# Patient Record
Sex: MALE | Race: WHITE | Employment: FULL TIME
[De-identification: names, ages, dates, MRNs, and addresses within clinical notes are randomized per-mention and may not be internally consistent; named-entity substitution may affect disease eponyms.]

---

## 2017-01-02 DIAGNOSIS — I10 ESSENTIAL HYPERTENSION: Chronic | ICD-10-CM

## 2017-02-14 ENCOUNTER — OFFICE VISIT (OUTPATIENT)
Dept: INTERNAL MEDICINE | Facility: CLINIC | Age: 57
End: 2017-02-14

## 2017-02-14 VITALS
BODY MASS INDEX: 30.49 KG/M2 | SYSTOLIC BLOOD PRESSURE: 128 MMHG | RESPIRATION RATE: 14 BRPM | HEIGHT: 65 IN | WEIGHT: 183 LBS | DIASTOLIC BLOOD PRESSURE: 82 MMHG

## 2017-02-14 DIAGNOSIS — J20.1 ACUTE BRONCHITIS DUE TO HAEMOPHILUS INFLUENZAE: ICD-10-CM

## 2017-02-14 DIAGNOSIS — R73.03 PREDIABETES: ICD-10-CM

## 2017-02-14 DIAGNOSIS — K21.9 GASTROESOPHAGEAL REFLUX DISEASE WITHOUT ESOPHAGITIS: ICD-10-CM

## 2017-02-14 DIAGNOSIS — I10 ESSENTIAL HYPERTENSION: Primary | Chronic | ICD-10-CM

## 2017-02-14 PROCEDURE — G8484 FLU IMMUNIZE NO ADMIN: HCPCS | Performed by: INTERNAL MEDICINE

## 2017-02-14 PROCEDURE — 99213 OFFICE O/P EST LOW 20 MIN: CPT | Performed by: INTERNAL MEDICINE

## 2017-02-14 PROCEDURE — G8417 CALC BMI ABV UP PARAM F/U: HCPCS | Performed by: INTERNAL MEDICINE

## 2017-02-14 PROCEDURE — 1036F TOBACCO NON-USER: CPT | Performed by: INTERNAL MEDICINE

## 2017-02-14 PROCEDURE — G8599 NO ASA/ANTIPLAT THER USE RNG: HCPCS | Performed by: INTERNAL MEDICINE

## 2017-02-14 PROCEDURE — G8427 DOCREV CUR MEDS BY ELIG CLIN: HCPCS | Performed by: INTERNAL MEDICINE

## 2017-02-14 PROCEDURE — 3017F COLORECTAL CA SCREEN DOC REV: CPT | Performed by: INTERNAL MEDICINE

## 2017-02-14 RX ORDER — AMOXICILLIN AND CLAVULANATE POTASSIUM 875; 125 MG/1; MG/1
1 TABLET, FILM COATED ORAL 2 TIMES DAILY
Qty: 20 TABLET | Refills: 0 | Status: SHIPPED | OUTPATIENT
Start: 2017-02-14 | End: 2017-02-14 | Stop reason: SDUPTHER

## 2017-02-14 RX ORDER — AMOXICILLIN AND CLAVULANATE POTASSIUM 875; 125 MG/1; MG/1
1 TABLET, FILM COATED ORAL 2 TIMES DAILY
Qty: 20 TABLET | Refills: 0 | Status: SHIPPED | OUTPATIENT
Start: 2017-02-14 | End: 2017-02-24

## 2017-02-14 ASSESSMENT — ENCOUNTER SYMPTOMS
SORE THROAT: 1
NAUSEA: 0
EYE PAIN: 0
DIARRHEA: 0
COUGH: 1
SHORTNESS OF BREATH: 0
ABDOMINAL PAIN: 0
TROUBLE SWALLOWING: 0
VOMITING: 0
BLOOD IN STOOL: 0
WHEEZING: 0
CHEST TIGHTNESS: 0
SINUS PAIN: 1
COLOR CHANGE: 0
SINUS PRESSURE: 1
ANAL BLEEDING: 0
BACK PAIN: 0
CONSTIPATION: 0
RHINORRHEA: 1
EYE DISCHARGE: 0

## 2017-02-21 ENCOUNTER — OFFICE VISIT (OUTPATIENT)
Dept: INTERNAL MEDICINE | Facility: CLINIC | Age: 57
End: 2017-02-21

## 2017-02-21 VITALS
SYSTOLIC BLOOD PRESSURE: 114 MMHG | HEART RATE: 74 BPM | OXYGEN SATURATION: 95 % | BODY MASS INDEX: 30.49 KG/M2 | HEIGHT: 65 IN | WEIGHT: 183 LBS | DIASTOLIC BLOOD PRESSURE: 78 MMHG | TEMPERATURE: 98.3 F

## 2017-02-21 DIAGNOSIS — J20.9 ACUTE BRONCHITIS, UNSPECIFIED ORGANISM: Primary | ICD-10-CM

## 2017-02-21 PROCEDURE — 1036F TOBACCO NON-USER: CPT | Performed by: NURSE PRACTITIONER

## 2017-02-21 PROCEDURE — G8427 DOCREV CUR MEDS BY ELIG CLIN: HCPCS | Performed by: NURSE PRACTITIONER

## 2017-02-21 PROCEDURE — 99213 OFFICE O/P EST LOW 20 MIN: CPT | Performed by: NURSE PRACTITIONER

## 2017-02-21 PROCEDURE — 3017F COLORECTAL CA SCREEN DOC REV: CPT | Performed by: NURSE PRACTITIONER

## 2017-02-21 PROCEDURE — G8417 CALC BMI ABV UP PARAM F/U: HCPCS | Performed by: NURSE PRACTITIONER

## 2017-02-21 PROCEDURE — G8599 NO ASA/ANTIPLAT THER USE RNG: HCPCS | Performed by: NURSE PRACTITIONER

## 2017-02-21 PROCEDURE — G8484 FLU IMMUNIZE NO ADMIN: HCPCS | Performed by: NURSE PRACTITIONER

## 2017-02-21 RX ORDER — CEFUROXIME AXETIL 500 MG/1
500 TABLET ORAL 2 TIMES DAILY
Qty: 20 TABLET | Refills: 0 | Status: SHIPPED | OUTPATIENT
Start: 2017-02-21 | End: 2017-03-03

## 2017-02-21 RX ORDER — BENZONATATE 100 MG/1
100 CAPSULE ORAL 3 TIMES DAILY PRN
Qty: 30 CAPSULE | Refills: 0 | Status: SHIPPED | OUTPATIENT
Start: 2017-02-21 | End: 2017-03-03

## 2017-02-21 ASSESSMENT — ENCOUNTER SYMPTOMS
SORE THROAT: 1
COUGH: 1
RHINORRHEA: 1
SINUS PRESSURE: 1

## 2017-04-11 RX ORDER — DOXYCYCLINE HYCLATE 100 MG
100 TABLET ORAL 2 TIMES DAILY
Qty: 20 TABLET | Refills: 0 | Status: SHIPPED | OUTPATIENT
Start: 2017-04-11 | End: 2017-04-21

## 2017-05-08 ENCOUNTER — TELEPHONE (OUTPATIENT)
Dept: INTERNAL MEDICINE CLINIC | Age: 57
End: 2017-05-08

## 2017-08-01 ENCOUNTER — TELEPHONE (OUTPATIENT)
Dept: INTERNAL MEDICINE CLINIC | Age: 57
End: 2017-08-01

## 2017-12-30 DIAGNOSIS — I10 ESSENTIAL HYPERTENSION: Chronic | ICD-10-CM

## 2018-02-16 ENCOUNTER — OFFICE VISIT (OUTPATIENT)
Dept: INTERNAL MEDICINE CLINIC | Age: 58
End: 2018-02-16
Payer: COMMERCIAL

## 2018-02-16 VITALS
BODY MASS INDEX: 31.65 KG/M2 | DIASTOLIC BLOOD PRESSURE: 68 MMHG | HEIGHT: 65 IN | WEIGHT: 190 LBS | SYSTOLIC BLOOD PRESSURE: 110 MMHG

## 2018-02-16 DIAGNOSIS — I10 ESSENTIAL HYPERTENSION: Chronic | ICD-10-CM

## 2018-02-16 DIAGNOSIS — J40 TRACHEOBRONCHITIS: Primary | ICD-10-CM

## 2018-02-16 DIAGNOSIS — E78.2 MIXED HYPERLIPIDEMIA: ICD-10-CM

## 2018-02-16 PROCEDURE — G8417 CALC BMI ABV UP PARAM F/U: HCPCS | Performed by: INTERNAL MEDICINE

## 2018-02-16 PROCEDURE — G8427 DOCREV CUR MEDS BY ELIG CLIN: HCPCS | Performed by: INTERNAL MEDICINE

## 2018-02-16 PROCEDURE — 1036F TOBACCO NON-USER: CPT | Performed by: INTERNAL MEDICINE

## 2018-02-16 PROCEDURE — G8484 FLU IMMUNIZE NO ADMIN: HCPCS | Performed by: INTERNAL MEDICINE

## 2018-02-16 PROCEDURE — 3017F COLORECTAL CA SCREEN DOC REV: CPT | Performed by: INTERNAL MEDICINE

## 2018-02-16 PROCEDURE — 99213 OFFICE O/P EST LOW 20 MIN: CPT | Performed by: INTERNAL MEDICINE

## 2018-02-16 PROCEDURE — G8599 NO ASA/ANTIPLAT THER USE RNG: HCPCS | Performed by: INTERNAL MEDICINE

## 2018-02-16 RX ORDER — CEPHALEXIN 250 MG/1
250 CAPSULE ORAL 3 TIMES DAILY
Qty: 21 CAPSULE | Refills: 0 | Status: SHIPPED | OUTPATIENT
Start: 2018-02-16 | End: 2018-02-23

## 2018-02-16 RX ORDER — METHYLPREDNISOLONE 4 MG/1
TABLET ORAL
Qty: 1 KIT | Refills: 0 | Status: SHIPPED | OUTPATIENT
Start: 2018-02-16 | End: 2018-02-22

## 2018-02-16 ASSESSMENT — PATIENT HEALTH QUESTIONNAIRE - PHQ9
SUM OF ALL RESPONSES TO PHQ9 QUESTIONS 1 & 2: 2
SUM OF ALL RESPONSES TO PHQ QUESTIONS 1-9: 2
1. LITTLE INTEREST OR PLEASURE IN DOING THINGS: 1
2. FEELING DOWN, DEPRESSED OR HOPELESS: 1

## 2018-02-16 ASSESSMENT — ENCOUNTER SYMPTOMS
SHORTNESS OF BREATH: 0
COUGH: 1
EYE PAIN: 0
DIARRHEA: 0
TROUBLE SWALLOWING: 0
COLOR CHANGE: 0
ABDOMINAL DISTENTION: 0
SORE THROAT: 1
EYE DISCHARGE: 0
WHEEZING: 0
BLOOD IN STOOL: 0

## 2018-02-16 NOTE — PROGRESS NOTES
and palpitations. Gastrointestinal: Negative for abdominal distention, blood in stool and diarrhea. Endocrine: Negative for polydipsia and polyphagia. Genitourinary: Negative for difficulty urinating and frequency. Musculoskeletal: Negative for gait problem, myalgias and neck pain. Skin: Negative for color change and rash. Allergic/Immunologic: Negative for environmental allergies and food allergies. Neurological: Negative for dizziness and headaches. Hematological: Negative for adenopathy. Does not bruise/bleed easily. Psychiatric/Behavioral: Negative for behavioral problems and sleep disturbance. Objective:   Physical Exam   Constitutional: He is oriented to person, place, and time. He appears well-developed and well-nourished. obese   HENT:   Head: Normocephalic and atraumatic. Eyes: Conjunctivae and EOM are normal. Right eye exhibits no discharge. Left eye exhibits no discharge. Right conjunctiva is not injected. Left conjunctiva is not injected. Right eye exhibits normal extraocular motion. Left eye exhibits normal extraocular motion. Neck: Normal range of motion. Neck supple. No JVD present. No edema and no erythema present. No thyroid mass and no thyromegaly present. Cardiovascular: Normal rate and regular rhythm. Exam reveals no friction rub. No murmur heard. Pulmonary/Chest: Effort normal and breath sounds normal. No accessory muscle usage. No tachypnea and no bradypnea. No respiratory distress. He has no wheezes. He has no rales. Abdominal: Soft. Bowel sounds are normal. He exhibits no distension. There is no tenderness. There is no rebound. Musculoskeletal: Normal range of motion. He exhibits no edema or tenderness. Lymphadenopathy:        Head (right side): No submental and no submandibular adenopathy present. Head (left side): No submental and no submandibular adenopathy present. He has no cervical adenopathy.    Neurological: He is alert and oriented

## 2018-02-21 ENCOUNTER — TELEPHONE (OUTPATIENT)
Dept: INTERNAL MEDICINE CLINIC | Age: 58
End: 2018-02-21

## 2018-03-08 ENCOUNTER — HOSPITAL ENCOUNTER (OUTPATIENT)
Age: 58
Setting detail: SPECIMEN
Discharge: HOME | End: 2018-03-08
Payer: COMMERCIAL

## 2018-03-08 DIAGNOSIS — J40 TRACHEOBRONCHITIS: ICD-10-CM

## 2018-03-08 LAB
ABSOLUTE EOS #: 0.14 K/UL (ref 0–0.44)
ABSOLUTE IMMATURE GRANULOCYTE: <0.03 K/UL (ref 0–0.3)
ABSOLUTE LYMPH #: 1.82 K/UL (ref 1.1–3.7)
ABSOLUTE MONO #: 0.47 K/UL (ref 0.1–1.2)
ANION GAP SERPL CALCULATED.3IONS-SCNC: 15 MMOL/L (ref 9–17)
BASOPHILS # BLD: 1 % (ref 0–2)
BASOPHILS ABSOLUTE: 0.04 K/UL (ref 0–0.2)
BUN BLDV-MCNC: 19 MG/DL (ref 6–20)
BUN/CREAT BLD: ABNORMAL (ref 9–20)
CALCIUM SERPL-MCNC: 8.8 MG/DL (ref 8.6–10.4)
CHLORIDE BLD-SCNC: 109 MMOL/L (ref 98–107)
CHOLESTEROL/HDL RATIO: 4.5
CHOLESTEROL: 177 MG/DL
CO2: 20 MMOL/L (ref 20–31)
CREAT SERPL-MCNC: 0.8 MG/DL (ref 0.7–1.2)
DIFFERENTIAL TYPE: NORMAL
EOSINOPHILS RELATIVE PERCENT: 3 % (ref 1–4)
GFR AFRICAN AMERICAN: >60 ML/MIN
GFR NON-AFRICAN AMERICAN: >60 ML/MIN
GFR SERPL CREATININE-BSD FRML MDRD: ABNORMAL ML/MIN/{1.73_M2}
GFR SERPL CREATININE-BSD FRML MDRD: ABNORMAL ML/MIN/{1.73_M2}
GLUCOSE BLD-MCNC: 103 MG/DL (ref 70–99)
HCT VFR BLD CALC: 44.3 % (ref 40.7–50.3)
HDLC SERPL-MCNC: 39 MG/DL
HEMOGLOBIN: 14.6 G/DL (ref 13–17)
IMMATURE GRANULOCYTES: 0 %
LDL CHOLESTEROL: 101 MG/DL (ref 0–130)
LYMPHOCYTES # BLD: 33 % (ref 24–43)
MCH RBC QN AUTO: 30.7 PG (ref 25.2–33.5)
MCHC RBC AUTO-ENTMCNC: 33 G/DL (ref 28.4–34.8)
MCV RBC AUTO: 93.1 FL (ref 82.6–102.9)
MONOCYTES # BLD: 9 % (ref 3–12)
NRBC AUTOMATED: 0 PER 100 WBC
PDW BLD-RTO: 13.2 % (ref 11.8–14.4)
PLATELET # BLD: 252 K/UL (ref 138–453)
PLATELET ESTIMATE: NORMAL
PMV BLD AUTO: 9.9 FL (ref 8.1–13.5)
POTASSIUM SERPL-SCNC: 4.9 MMOL/L (ref 3.7–5.3)
RBC # BLD: 4.76 M/UL (ref 4.21–5.77)
RBC # BLD: NORMAL 10*6/UL
SEG NEUTROPHILS: 54 % (ref 36–65)
SEGMENTED NEUTROPHILS ABSOLUTE COUNT: 3.07 K/UL (ref 1.5–8.1)
SODIUM BLD-SCNC: 144 MMOL/L (ref 135–144)
TRIGL SERPL-MCNC: 184 MG/DL
VLDLC SERPL CALC-MCNC: ABNORMAL MG/DL (ref 1–30)
WBC # BLD: 5.6 K/UL (ref 3.5–11.3)
WBC # BLD: NORMAL 10*3/UL

## 2018-03-15 ENCOUNTER — OFFICE VISIT (OUTPATIENT)
Dept: INTERNAL MEDICINE CLINIC | Age: 58
End: 2018-03-15
Payer: COMMERCIAL

## 2018-03-15 VITALS
WEIGHT: 188 LBS | BODY MASS INDEX: 31.32 KG/M2 | SYSTOLIC BLOOD PRESSURE: 110 MMHG | HEIGHT: 65 IN | DIASTOLIC BLOOD PRESSURE: 74 MMHG

## 2018-03-15 DIAGNOSIS — I10 ESSENTIAL HYPERTENSION: Primary | Chronic | ICD-10-CM

## 2018-03-15 DIAGNOSIS — R73.9 HYPERGLYCEMIA: ICD-10-CM

## 2018-03-15 DIAGNOSIS — K59.1 DIARRHEA, FUNCTIONAL: ICD-10-CM

## 2018-03-15 PROCEDURE — G8417 CALC BMI ABV UP PARAM F/U: HCPCS | Performed by: INTERNAL MEDICINE

## 2018-03-15 PROCEDURE — 1036F TOBACCO NON-USER: CPT | Performed by: INTERNAL MEDICINE

## 2018-03-15 PROCEDURE — G8599 NO ASA/ANTIPLAT THER USE RNG: HCPCS | Performed by: INTERNAL MEDICINE

## 2018-03-15 PROCEDURE — G8427 DOCREV CUR MEDS BY ELIG CLIN: HCPCS | Performed by: INTERNAL MEDICINE

## 2018-03-15 PROCEDURE — 3017F COLORECTAL CA SCREEN DOC REV: CPT | Performed by: INTERNAL MEDICINE

## 2018-03-15 PROCEDURE — 99213 OFFICE O/P EST LOW 20 MIN: CPT | Performed by: INTERNAL MEDICINE

## 2018-03-15 PROCEDURE — G8484 FLU IMMUNIZE NO ADMIN: HCPCS | Performed by: INTERNAL MEDICINE

## 2018-03-15 RX ORDER — LOPERAMIDE HYDROCHLORIDE 2 MG/1
2 CAPSULE ORAL 4 TIMES DAILY PRN
Qty: 90 CAPSULE | Refills: 1 | Status: SHIPPED | OUTPATIENT
Start: 2018-03-15 | End: 2018-04-14

## 2018-03-15 ASSESSMENT — ENCOUNTER SYMPTOMS
DIARRHEA: 1
COUGH: 0
WHEEZING: 0
ABDOMINAL DISTENTION: 0
SHORTNESS OF BREATH: 0
COLOR CHANGE: 0
BLOOD IN STOOL: 0
EYE PAIN: 0
EYE DISCHARGE: 0
TROUBLE SWALLOWING: 0

## 2018-03-15 NOTE — PROGRESS NOTES
normal muscle tone. Coordination normal.   Skin: Skin is warm. No bruising, no ecchymosis and no rash noted. He is not diaphoretic. No pallor. Psychiatric: He has a normal mood and affect. His behavior is normal. His mood appears not anxious. His affect is not angry. His speech is not slurred. He is not aggressive. Cognition and memory are not impaired. He expresses no homicidal ideation. I have personally reviewed and agree with the patient Paolo NAVARRO Shayla is a 56 y.o. male who presents today for follow up on his chronic medical conditions as noted below. Bonnie Vergara is c/o of   Chief Complaint   Patient presents with    Results     labs    Diarrhea     pt states since gallbladder removal in oct of 2016, pt states anything he eats goes right through him.         Patient Active Problem List:     Other and unspecified angina pectoris     Anxiety state     Essential hypertension     Sleep apnea     Other and unspecified hyperlipidemia     Esophageal reflux     Rectal bleeding     Diarrhea, functional     Calculus of gallbladder     Calculus of gallbladder without cholecystitis without obstruction     Acute bronchitis due to Haemophilus influenzae     Past Medical History:   Diagnosis Date    Anxiety state, unspecified     Cholelithiasis     Hypoglycemia     Other and unspecified angina pectoris     2009-stress related    Other and unspecified hyperlipidemia     hx of-no meds    Unspecified essential hypertension     Unspecified sleep apnea     Vision abnormalities       Past Surgical History:   Procedure Laterality Date    COLONOSCOPY  12/4/14    NORMAL-RANDOM BIOPSY     Family History   Problem Relation Age of Onset    Heart Failure Father     Cancer Mother      outside the colon, and breast    Diabetes Sister     Cancer Maternal Grandmother      kidney    Cancer Maternal Grandfather      bladder     Current Outpatient Prescriptions   Medication Sig Dispense Refill   

## 2018-05-01 ENCOUNTER — OFFICE VISIT (OUTPATIENT)
Dept: INTERNAL MEDICINE CLINIC | Age: 58
End: 2018-05-01
Payer: COMMERCIAL

## 2018-05-01 ENCOUNTER — TELEPHONE (OUTPATIENT)
Dept: INTERNAL MEDICINE CLINIC | Age: 58
End: 2018-05-01

## 2018-05-01 VITALS
WEIGHT: 186 LBS | HEIGHT: 65 IN | SYSTOLIC BLOOD PRESSURE: 118 MMHG | DIASTOLIC BLOOD PRESSURE: 74 MMHG | BODY MASS INDEX: 30.99 KG/M2

## 2018-05-01 DIAGNOSIS — K59.1 DIARRHEA, FUNCTIONAL: ICD-10-CM

## 2018-05-01 PROCEDURE — 3017F COLORECTAL CA SCREEN DOC REV: CPT | Performed by: INTERNAL MEDICINE

## 2018-05-01 PROCEDURE — 1036F TOBACCO NON-USER: CPT | Performed by: INTERNAL MEDICINE

## 2018-05-01 PROCEDURE — 99214 OFFICE O/P EST MOD 30 MIN: CPT | Performed by: INTERNAL MEDICINE

## 2018-05-01 PROCEDURE — G8427 DOCREV CUR MEDS BY ELIG CLIN: HCPCS | Performed by: INTERNAL MEDICINE

## 2018-05-01 PROCEDURE — G8599 NO ASA/ANTIPLAT THER USE RNG: HCPCS | Performed by: INTERNAL MEDICINE

## 2018-05-01 PROCEDURE — G8417 CALC BMI ABV UP PARAM F/U: HCPCS | Performed by: INTERNAL MEDICINE

## 2018-05-01 RX ORDER — CIPROFLOXACIN 500 MG/1
500 TABLET, FILM COATED ORAL 2 TIMES DAILY
Qty: 14 TABLET | Refills: 0 | Status: SHIPPED | OUTPATIENT
Start: 2018-05-01 | End: 2018-05-08

## 2018-05-11 ENCOUNTER — OFFICE VISIT (OUTPATIENT)
Dept: INTERNAL MEDICINE CLINIC | Age: 58
End: 2018-05-11
Payer: COMMERCIAL

## 2018-05-11 VITALS
HEIGHT: 65 IN | WEIGHT: 184 LBS | SYSTOLIC BLOOD PRESSURE: 112 MMHG | DIASTOLIC BLOOD PRESSURE: 70 MMHG | BODY MASS INDEX: 30.66 KG/M2

## 2018-05-11 DIAGNOSIS — I10 ESSENTIAL HYPERTENSION: Primary | Chronic | ICD-10-CM

## 2018-05-11 DIAGNOSIS — J30.2 SEASONAL ALLERGIC RHINITIS, UNSPECIFIED CHRONICITY, UNSPECIFIED TRIGGER: ICD-10-CM

## 2018-05-11 DIAGNOSIS — R10.13 EPIGASTRIC PAIN: ICD-10-CM

## 2018-05-11 PROCEDURE — G8599 NO ASA/ANTIPLAT THER USE RNG: HCPCS | Performed by: INTERNAL MEDICINE

## 2018-05-11 PROCEDURE — G8427 DOCREV CUR MEDS BY ELIG CLIN: HCPCS | Performed by: INTERNAL MEDICINE

## 2018-05-11 PROCEDURE — 1036F TOBACCO NON-USER: CPT | Performed by: INTERNAL MEDICINE

## 2018-05-11 PROCEDURE — G8417 CALC BMI ABV UP PARAM F/U: HCPCS | Performed by: INTERNAL MEDICINE

## 2018-05-11 PROCEDURE — 99214 OFFICE O/P EST MOD 30 MIN: CPT | Performed by: INTERNAL MEDICINE

## 2018-05-11 PROCEDURE — 3017F COLORECTAL CA SCREEN DOC REV: CPT | Performed by: INTERNAL MEDICINE

## 2018-05-11 RX ORDER — METHYLPREDNISOLONE 4 MG/1
TABLET ORAL
Qty: 1 KIT | Refills: 0 | Status: SHIPPED | OUTPATIENT
Start: 2018-05-11 | End: 2018-05-17

## 2018-05-11 ASSESSMENT — ENCOUNTER SYMPTOMS
BLOOD IN STOOL: 0
BACK PAIN: 1
ABDOMINAL DISTENTION: 0
SHORTNESS OF BREATH: 0
COUGH: 0
ABDOMINAL PAIN: 1
WHEEZING: 0
TROUBLE SWALLOWING: 0
COLOR CHANGE: 0
EYE DISCHARGE: 0
DIARRHEA: 0
EYE PAIN: 0

## 2018-05-11 ASSESSMENT — PATIENT HEALTH QUESTIONNAIRE - PHQ9
1. LITTLE INTEREST OR PLEASURE IN DOING THINGS: 1
2. FEELING DOWN, DEPRESSED OR HOPELESS: 1
SUM OF ALL RESPONSES TO PHQ9 QUESTIONS 1 & 2: 2
SUM OF ALL RESPONSES TO PHQ QUESTIONS 1-9: 2

## 2018-05-22 ENCOUNTER — HOSPITAL ENCOUNTER (OUTPATIENT)
Dept: CT IMAGING | Age: 58
Discharge: HOME | End: 2018-05-24
Payer: COMMERCIAL

## 2018-05-22 DIAGNOSIS — R10.13 EPIGASTRIC PAIN: ICD-10-CM

## 2018-05-22 LAB
CREAT SERPL-MCNC: 0.8 MG/DL (ref 0.7–1.2)
GFR AFRICAN AMERICAN: >60 ML/MIN
GFR NON-AFRICAN AMERICAN: >60 ML/MIN
GFR SERPL CREATININE-BSD FRML MDRD: NORMAL ML/MIN/{1.73_M2}
GFR SERPL CREATININE-BSD FRML MDRD: NORMAL ML/MIN/{1.73_M2}

## 2018-05-22 PROCEDURE — 2580000003 HC RX 258: Performed by: INTERNAL MEDICINE

## 2018-05-22 PROCEDURE — 82565 ASSAY OF CREATININE: CPT

## 2018-05-22 PROCEDURE — 74177 CT ABD & PELVIS W/CONTRAST: CPT

## 2018-05-22 PROCEDURE — 36415 COLL VENOUS BLD VENIPUNCTURE: CPT

## 2018-05-22 PROCEDURE — 6360000004 HC RX CONTRAST MEDICATION: Performed by: INTERNAL MEDICINE

## 2018-05-22 RX ORDER — 0.9 % SODIUM CHLORIDE 0.9 %
80 INTRAVENOUS SOLUTION INTRAVENOUS ONCE
Status: COMPLETED | OUTPATIENT
Start: 2018-05-22 | End: 2018-05-22

## 2018-05-22 RX ORDER — SODIUM CHLORIDE 0.9 % (FLUSH) 0.9 %
10 SYRINGE (ML) INJECTION PRN
Status: DISCONTINUED | OUTPATIENT
Start: 2018-05-22 | End: 2018-05-25 | Stop reason: HOSPADM

## 2018-05-22 RX ADMIN — IOHEXOL 50 ML: 240 INJECTION, SOLUTION INTRATHECAL; INTRAVASCULAR; INTRAVENOUS; ORAL at 15:13

## 2018-05-22 RX ADMIN — SODIUM CHLORIDE 80 ML: 9 INJECTION, SOLUTION INTRAVENOUS at 15:12

## 2018-05-22 RX ADMIN — IOPAMIDOL 75 ML: 755 INJECTION, SOLUTION INTRAVENOUS at 15:13

## 2018-05-22 RX ADMIN — Medication 10 ML: at 15:13

## 2018-05-25 ENCOUNTER — HOSPITAL ENCOUNTER (OUTPATIENT)
Age: 58
Setting detail: SPECIMEN
Discharge: HOME | End: 2018-05-25
Payer: COMMERCIAL

## 2018-05-25 ENCOUNTER — OFFICE VISIT (OUTPATIENT)
Dept: INTERNAL MEDICINE CLINIC | Age: 58
End: 2018-05-25
Payer: COMMERCIAL

## 2018-05-25 VITALS
HEART RATE: 72 BPM | HEIGHT: 65 IN | BODY MASS INDEX: 30.66 KG/M2 | SYSTOLIC BLOOD PRESSURE: 122 MMHG | DIASTOLIC BLOOD PRESSURE: 80 MMHG | WEIGHT: 184 LBS

## 2018-05-25 DIAGNOSIS — K58.0 IRRITABLE BOWEL SYNDROME WITH DIARRHEA: ICD-10-CM

## 2018-05-25 DIAGNOSIS — K58.0 IRRITABLE BOWEL SYNDROME WITH DIARRHEA: Primary | ICD-10-CM

## 2018-05-25 PROCEDURE — 3017F COLORECTAL CA SCREEN DOC REV: CPT | Performed by: INTERNAL MEDICINE

## 2018-05-25 PROCEDURE — G8599 NO ASA/ANTIPLAT THER USE RNG: HCPCS | Performed by: INTERNAL MEDICINE

## 2018-05-25 PROCEDURE — G8417 CALC BMI ABV UP PARAM F/U: HCPCS | Performed by: INTERNAL MEDICINE

## 2018-05-25 PROCEDURE — 99213 OFFICE O/P EST LOW 20 MIN: CPT | Performed by: INTERNAL MEDICINE

## 2018-05-25 PROCEDURE — G8427 DOCREV CUR MEDS BY ELIG CLIN: HCPCS | Performed by: INTERNAL MEDICINE

## 2018-05-25 PROCEDURE — 1036F TOBACCO NON-USER: CPT | Performed by: INTERNAL MEDICINE

## 2018-05-29 LAB — TISSUE TRANSGLUTAMINASE IGA: 0.5 U/ML

## 2018-06-04 ASSESSMENT — ENCOUNTER SYMPTOMS
WHEEZING: 0
FACIAL SWELLING: 0
CHEST TIGHTNESS: 0
CONSTIPATION: 0
COUGH: 0
ABDOMINAL PAIN: 1
APNEA: 0
BACK PAIN: 0
DIARRHEA: 0
COLOR CHANGE: 0
NAUSEA: 1
SHORTNESS OF BREATH: 0
ABDOMINAL DISTENTION: 0

## 2018-06-29 ENCOUNTER — HOSPITAL ENCOUNTER (OUTPATIENT)
Age: 58
Setting detail: SPECIMEN
Discharge: HOME | End: 2018-06-29
Payer: COMMERCIAL

## 2018-06-29 DIAGNOSIS — R73.9 HYPERGLYCEMIA: ICD-10-CM

## 2018-06-29 LAB
ANION GAP SERPL CALCULATED.3IONS-SCNC: 12 MMOL/L (ref 9–17)
BUN BLDV-MCNC: 18 MG/DL (ref 6–20)
BUN/CREAT BLD: NORMAL (ref 9–20)
CALCIUM SERPL-MCNC: 8.7 MG/DL (ref 8.6–10.4)
CHLORIDE BLD-SCNC: 107 MMOL/L (ref 98–107)
CO2: 21 MMOL/L (ref 20–31)
CREAT SERPL-MCNC: 0.81 MG/DL (ref 0.7–1.2)
ESTIMATED AVERAGE GLUCOSE: 108 MG/DL
GFR AFRICAN AMERICAN: >60 ML/MIN
GFR NON-AFRICAN AMERICAN: >60 ML/MIN
GFR SERPL CREATININE-BSD FRML MDRD: NORMAL ML/MIN/{1.73_M2}
GFR SERPL CREATININE-BSD FRML MDRD: NORMAL ML/MIN/{1.73_M2}
GLUCOSE BLD-MCNC: 87 MG/DL (ref 70–99)
HBA1C MFR BLD: 5.4 % (ref 4–6)
POTASSIUM SERPL-SCNC: 4.3 MMOL/L (ref 3.7–5.3)
SODIUM BLD-SCNC: 140 MMOL/L (ref 135–144)

## 2018-07-03 ENCOUNTER — OFFICE VISIT (OUTPATIENT)
Dept: INTERNAL MEDICINE CLINIC | Age: 58
End: 2018-07-03
Payer: COMMERCIAL

## 2018-07-03 VITALS
HEIGHT: 65 IN | BODY MASS INDEX: 30.16 KG/M2 | DIASTOLIC BLOOD PRESSURE: 70 MMHG | SYSTOLIC BLOOD PRESSURE: 112 MMHG | WEIGHT: 181 LBS

## 2018-07-03 DIAGNOSIS — K21.9 GASTROESOPHAGEAL REFLUX DISEASE WITHOUT ESOPHAGITIS: ICD-10-CM

## 2018-07-03 DIAGNOSIS — K58.2 IRRITABLE BOWEL SYNDROME WITH BOTH CONSTIPATION AND DIARRHEA: ICD-10-CM

## 2018-07-03 DIAGNOSIS — I10 ESSENTIAL HYPERTENSION: Primary | Chronic | ICD-10-CM

## 2018-07-03 PROCEDURE — G8417 CALC BMI ABV UP PARAM F/U: HCPCS | Performed by: INTERNAL MEDICINE

## 2018-07-03 PROCEDURE — 99213 OFFICE O/P EST LOW 20 MIN: CPT | Performed by: INTERNAL MEDICINE

## 2018-07-03 PROCEDURE — 1036F TOBACCO NON-USER: CPT | Performed by: INTERNAL MEDICINE

## 2018-07-03 PROCEDURE — G8599 NO ASA/ANTIPLAT THER USE RNG: HCPCS | Performed by: INTERNAL MEDICINE

## 2018-07-03 PROCEDURE — 3017F COLORECTAL CA SCREEN DOC REV: CPT | Performed by: INTERNAL MEDICINE

## 2018-07-03 PROCEDURE — G8427 DOCREV CUR MEDS BY ELIG CLIN: HCPCS | Performed by: INTERNAL MEDICINE

## 2018-07-03 ASSESSMENT — ENCOUNTER SYMPTOMS
COUGH: 0
ABDOMINAL DISTENTION: 0
EYE PAIN: 0
WHEEZING: 0
COLOR CHANGE: 0
TROUBLE SWALLOWING: 0
DIARRHEA: 0
SHORTNESS OF BREATH: 0
BLOOD IN STOOL: 0
EYE DISCHARGE: 0

## 2018-11-02 ENCOUNTER — OFFICE VISIT (OUTPATIENT)
Dept: INTERNAL MEDICINE CLINIC | Age: 58
End: 2018-11-02
Payer: COMMERCIAL

## 2018-11-02 VITALS
OXYGEN SATURATION: 97 % | HEIGHT: 65 IN | DIASTOLIC BLOOD PRESSURE: 74 MMHG | BODY MASS INDEX: 31.16 KG/M2 | HEART RATE: 64 BPM | SYSTOLIC BLOOD PRESSURE: 110 MMHG | TEMPERATURE: 98.2 F | WEIGHT: 187 LBS

## 2018-11-02 DIAGNOSIS — J20.9 ACUTE BRONCHITIS, UNSPECIFIED ORGANISM: Primary | ICD-10-CM

## 2018-11-02 PROCEDURE — G8417 CALC BMI ABV UP PARAM F/U: HCPCS | Performed by: PHYSICIAN ASSISTANT

## 2018-11-02 PROCEDURE — 99213 OFFICE O/P EST LOW 20 MIN: CPT | Performed by: PHYSICIAN ASSISTANT

## 2018-11-02 PROCEDURE — G8427 DOCREV CUR MEDS BY ELIG CLIN: HCPCS | Performed by: PHYSICIAN ASSISTANT

## 2018-11-02 PROCEDURE — 3017F COLORECTAL CA SCREEN DOC REV: CPT | Performed by: PHYSICIAN ASSISTANT

## 2018-11-02 PROCEDURE — 1036F TOBACCO NON-USER: CPT | Performed by: PHYSICIAN ASSISTANT

## 2018-11-02 PROCEDURE — G8599 NO ASA/ANTIPLAT THER USE RNG: HCPCS | Performed by: PHYSICIAN ASSISTANT

## 2018-11-02 PROCEDURE — G8484 FLU IMMUNIZE NO ADMIN: HCPCS | Performed by: PHYSICIAN ASSISTANT

## 2018-11-02 RX ORDER — BENZONATATE 100 MG/1
100 CAPSULE ORAL 3 TIMES DAILY PRN
Qty: 21 CAPSULE | Refills: 0 | Status: SHIPPED | OUTPATIENT
Start: 2018-11-02 | End: 2018-11-09

## 2018-11-02 RX ORDER — CEFUROXIME AXETIL 500 MG/1
500 TABLET ORAL 2 TIMES DAILY
Qty: 14 TABLET | Refills: 0 | Status: SHIPPED | OUTPATIENT
Start: 2018-11-02 | End: 2018-11-09

## 2018-11-02 ASSESSMENT — ENCOUNTER SYMPTOMS
VOMITING: 0
COUGH: 1
COLOR CHANGE: 0
SINUS PRESSURE: 0
NAUSEA: 0
EYE PAIN: 0
CHEST TIGHTNESS: 0
SHORTNESS OF BREATH: 0
ABDOMINAL PAIN: 0
SORE THROAT: 1
RHINORRHEA: 0
EYE REDNESS: 0

## 2018-11-05 ENCOUNTER — TELEPHONE (OUTPATIENT)
Dept: INTERNAL MEDICINE CLINIC | Age: 58
End: 2018-11-05

## 2018-11-05 DIAGNOSIS — R05.9 COUGH: Primary | ICD-10-CM

## 2018-12-25 DIAGNOSIS — I10 ESSENTIAL HYPERTENSION: Chronic | ICD-10-CM

## 2019-02-13 ENCOUNTER — OFFICE VISIT (OUTPATIENT)
Dept: INTERNAL MEDICINE CLINIC | Age: 59
End: 2019-02-13
Payer: COMMERCIAL

## 2019-02-13 VITALS
HEIGHT: 65 IN | WEIGHT: 187 LBS | SYSTOLIC BLOOD PRESSURE: 118 MMHG | DIASTOLIC BLOOD PRESSURE: 80 MMHG | BODY MASS INDEX: 31.16 KG/M2

## 2019-02-13 DIAGNOSIS — J40 TRACHEOBRONCHITIS: ICD-10-CM

## 2019-02-13 DIAGNOSIS — I10 ESSENTIAL HYPERTENSION: Primary | Chronic | ICD-10-CM

## 2019-02-13 PROCEDURE — G8417 CALC BMI ABV UP PARAM F/U: HCPCS | Performed by: INTERNAL MEDICINE

## 2019-02-13 PROCEDURE — G8427 DOCREV CUR MEDS BY ELIG CLIN: HCPCS | Performed by: INTERNAL MEDICINE

## 2019-02-13 PROCEDURE — G8599 NO ASA/ANTIPLAT THER USE RNG: HCPCS | Performed by: INTERNAL MEDICINE

## 2019-02-13 PROCEDURE — 99213 OFFICE O/P EST LOW 20 MIN: CPT | Performed by: INTERNAL MEDICINE

## 2019-02-13 PROCEDURE — G8484 FLU IMMUNIZE NO ADMIN: HCPCS | Performed by: INTERNAL MEDICINE

## 2019-02-13 PROCEDURE — 1036F TOBACCO NON-USER: CPT | Performed by: INTERNAL MEDICINE

## 2019-02-13 PROCEDURE — 3017F COLORECTAL CA SCREEN DOC REV: CPT | Performed by: INTERNAL MEDICINE

## 2019-02-13 RX ORDER — METHYLPREDNISOLONE 4 MG/1
TABLET ORAL
Qty: 1 KIT | Refills: 0 | Status: SHIPPED | OUTPATIENT
Start: 2019-02-13 | End: 2019-02-19

## 2019-02-13 RX ORDER — CEPHALEXIN 500 MG/1
500 CAPSULE ORAL 3 TIMES DAILY
Qty: 21 CAPSULE | Refills: 0 | Status: SHIPPED | OUTPATIENT
Start: 2019-02-13 | End: 2019-02-20

## 2019-02-13 ASSESSMENT — ENCOUNTER SYMPTOMS
DIARRHEA: 0
TROUBLE SWALLOWING: 0
COLOR CHANGE: 0
ABDOMINAL DISTENTION: 0
SORE THROAT: 1
EYE DISCHARGE: 0
EYE PAIN: 0
BLOOD IN STOOL: 0
SHORTNESS OF BREATH: 0
COUGH: 1
WHEEZING: 0

## 2019-02-13 ASSESSMENT — PATIENT HEALTH QUESTIONNAIRE - PHQ9
2. FEELING DOWN, DEPRESSED OR HOPELESS: 0
SUM OF ALL RESPONSES TO PHQ QUESTIONS 1-9: 0
SUM OF ALL RESPONSES TO PHQ9 QUESTIONS 1 & 2: 0
SUM OF ALL RESPONSES TO PHQ QUESTIONS 1-9: 0
1. LITTLE INTEREST OR PLEASURE IN DOING THINGS: 0

## 2019-02-22 ENCOUNTER — OFFICE VISIT (OUTPATIENT)
Dept: INTERNAL MEDICINE CLINIC | Age: 59
End: 2019-02-22
Payer: COMMERCIAL

## 2019-02-22 ENCOUNTER — TELEPHONE (OUTPATIENT)
Dept: INTERNAL MEDICINE CLINIC | Age: 59
End: 2019-02-22

## 2019-02-22 VITALS
DIASTOLIC BLOOD PRESSURE: 64 MMHG | SYSTOLIC BLOOD PRESSURE: 110 MMHG | HEIGHT: 65 IN | WEIGHT: 189 LBS | OXYGEN SATURATION: 95 % | HEART RATE: 89 BPM | BODY MASS INDEX: 31.49 KG/M2

## 2019-02-22 DIAGNOSIS — J40 BRONCHITIS: Primary | ICD-10-CM

## 2019-02-22 DIAGNOSIS — R05.9 COUGH: ICD-10-CM

## 2019-02-22 PROCEDURE — G8427 DOCREV CUR MEDS BY ELIG CLIN: HCPCS | Performed by: PHYSICIAN ASSISTANT

## 2019-02-22 PROCEDURE — G8599 NO ASA/ANTIPLAT THER USE RNG: HCPCS | Performed by: PHYSICIAN ASSISTANT

## 2019-02-22 PROCEDURE — 99213 OFFICE O/P EST LOW 20 MIN: CPT | Performed by: PHYSICIAN ASSISTANT

## 2019-02-22 PROCEDURE — G8417 CALC BMI ABV UP PARAM F/U: HCPCS | Performed by: PHYSICIAN ASSISTANT

## 2019-02-22 PROCEDURE — 3017F COLORECTAL CA SCREEN DOC REV: CPT | Performed by: PHYSICIAN ASSISTANT

## 2019-02-22 PROCEDURE — 1036F TOBACCO NON-USER: CPT | Performed by: PHYSICIAN ASSISTANT

## 2019-02-22 PROCEDURE — G8484 FLU IMMUNIZE NO ADMIN: HCPCS | Performed by: PHYSICIAN ASSISTANT

## 2019-02-22 RX ORDER — DOXYCYCLINE HYCLATE 100 MG/1
100 CAPSULE ORAL 2 TIMES DAILY
Qty: 20 CAPSULE | Refills: 0 | Status: SHIPPED | OUTPATIENT
Start: 2019-02-22 | End: 2019-03-04

## 2019-02-22 RX ORDER — BENZONATATE 100 MG/1
100 CAPSULE ORAL 3 TIMES DAILY PRN
Qty: 30 CAPSULE | Refills: 0 | Status: SHIPPED | OUTPATIENT
Start: 2019-02-22 | End: 2019-03-01

## 2019-02-22 ASSESSMENT — ENCOUNTER SYMPTOMS
COUGH: 1
BACK PAIN: 1
EYE REDNESS: 0
SORE THROAT: 0
CHEST TIGHTNESS: 0
NAUSEA: 0
VOMITING: 0
EYE PAIN: 0
ABDOMINAL PAIN: 0
SINUS PRESSURE: 1
SHORTNESS OF BREATH: 0
COLOR CHANGE: 0
RHINORRHEA: 0
WHEEZING: 0

## 2019-11-12 ENCOUNTER — OFFICE VISIT (OUTPATIENT)
Dept: INTERNAL MEDICINE CLINIC | Age: 59
End: 2019-11-12
Payer: COMMERCIAL

## 2019-11-12 VITALS
DIASTOLIC BLOOD PRESSURE: 74 MMHG | BODY MASS INDEX: 31.51 KG/M2 | HEIGHT: 65 IN | SYSTOLIC BLOOD PRESSURE: 118 MMHG | WEIGHT: 189.11 LBS | TEMPERATURE: 97.6 F

## 2019-11-12 DIAGNOSIS — G47.33 OBSTRUCTIVE SLEEP APNEA SYNDROME: Chronic | ICD-10-CM

## 2019-11-12 DIAGNOSIS — I10 ESSENTIAL HYPERTENSION: Primary | Chronic | ICD-10-CM

## 2019-11-12 DIAGNOSIS — J20.1 ACUTE BRONCHITIS DUE TO HAEMOPHILUS INFLUENZAE: ICD-10-CM

## 2019-11-12 PROCEDURE — 1036F TOBACCO NON-USER: CPT | Performed by: INTERNAL MEDICINE

## 2019-11-12 PROCEDURE — G8599 NO ASA/ANTIPLAT THER USE RNG: HCPCS | Performed by: INTERNAL MEDICINE

## 2019-11-12 PROCEDURE — 99213 OFFICE O/P EST LOW 20 MIN: CPT | Performed by: INTERNAL MEDICINE

## 2019-11-12 PROCEDURE — G8427 DOCREV CUR MEDS BY ELIG CLIN: HCPCS | Performed by: INTERNAL MEDICINE

## 2019-11-12 PROCEDURE — G8417 CALC BMI ABV UP PARAM F/U: HCPCS | Performed by: INTERNAL MEDICINE

## 2019-11-12 PROCEDURE — G8484 FLU IMMUNIZE NO ADMIN: HCPCS | Performed by: INTERNAL MEDICINE

## 2019-11-12 PROCEDURE — 3017F COLORECTAL CA SCREEN DOC REV: CPT | Performed by: INTERNAL MEDICINE

## 2019-11-12 RX ORDER — CLONAZEPAM 1 MG/1
1 TABLET ORAL NIGHTLY
COMMUNITY
Start: 2019-10-29

## 2019-11-12 RX ORDER — DOXYCYCLINE HYCLATE 100 MG
100 TABLET ORAL 2 TIMES DAILY
Qty: 14 TABLET | Refills: 0 | Status: SHIPPED | OUTPATIENT
Start: 2019-11-12 | End: 2019-11-19

## 2019-11-12 ASSESSMENT — ENCOUNTER SYMPTOMS
SINUS PAIN: 1
GASTROINTESTINAL NEGATIVE: 1
ALLERGIC/IMMUNOLOGIC NEGATIVE: 1
EYES NEGATIVE: 1
COUGH: 1
SINUS PRESSURE: 1

## 2019-12-22 DIAGNOSIS — I10 ESSENTIAL HYPERTENSION: Chronic | ICD-10-CM

## 2019-12-30 DIAGNOSIS — I10 ESSENTIAL HYPERTENSION: Chronic | ICD-10-CM

## 2020-02-12 ENCOUNTER — OFFICE VISIT (OUTPATIENT)
Dept: INTERNAL MEDICINE CLINIC | Age: 60
End: 2020-02-12
Payer: COMMERCIAL

## 2020-02-12 ENCOUNTER — HOSPITAL ENCOUNTER (OUTPATIENT)
Age: 60
Setting detail: SPECIMEN
Discharge: HOME | End: 2020-02-12
Payer: COMMERCIAL

## 2020-02-12 VITALS
BODY MASS INDEX: 31.65 KG/M2 | HEIGHT: 65 IN | RESPIRATION RATE: 18 BRPM | DIASTOLIC BLOOD PRESSURE: 74 MMHG | TEMPERATURE: 99.6 F | WEIGHT: 190 LBS | SYSTOLIC BLOOD PRESSURE: 114 MMHG | HEART RATE: 76 BPM

## 2020-02-12 PROBLEM — N40.0 BENIGN PROSTATIC HYPERPLASIA WITHOUT LOWER URINARY TRACT SYMPTOMS: Status: ACTIVE | Noted: 2020-02-12

## 2020-02-12 LAB
-: NORMAL
ABSOLUTE EOS #: 0.17 K/UL (ref 0–0.44)
ABSOLUTE IMMATURE GRANULOCYTE: 0.03 K/UL (ref 0–0.3)
ABSOLUTE LYMPH #: 2.5 K/UL (ref 1.1–3.7)
ABSOLUTE MONO #: 0.37 K/UL (ref 0.1–1.2)
ALBUMIN SERPL-MCNC: 4.2 G/DL (ref 3.5–5.2)
ALBUMIN/GLOBULIN RATIO: 1.4 (ref 1–2.5)
ALP BLD-CCNC: 72 U/L (ref 40–129)
ALT SERPL-CCNC: 69 U/L (ref 5–41)
AMORPHOUS: NORMAL
ANION GAP SERPL CALCULATED.3IONS-SCNC: 19 MMOL/L (ref 9–17)
AST SERPL-CCNC: 43 U/L
BACTERIA: NORMAL
BASOPHILS # BLD: 1 % (ref 0–2)
BASOPHILS ABSOLUTE: 0.06 K/UL (ref 0–0.2)
BILIRUB SERPL-MCNC: 0.31 MG/DL (ref 0.3–1.2)
BILIRUBIN URINE: NEGATIVE
BUN BLDV-MCNC: 16 MG/DL (ref 6–20)
BUN/CREAT BLD: ABNORMAL (ref 9–20)
CALCIUM SERPL-MCNC: 9 MG/DL (ref 8.6–10.4)
CASTS UA: NORMAL /LPF (ref 0–8)
CHLORIDE BLD-SCNC: 103 MMOL/L (ref 98–107)
CO2: 22 MMOL/L (ref 20–31)
COLOR: YELLOW
CREAT SERPL-MCNC: 0.78 MG/DL (ref 0.7–1.2)
CRYSTALS, UA: NORMAL /HPF
DIFFERENTIAL TYPE: ABNORMAL
EOSINOPHILS RELATIVE PERCENT: 3 % (ref 1–4)
EPITHELIAL CELLS UA: NORMAL /HPF (ref 0–5)
GFR AFRICAN AMERICAN: >60 ML/MIN
GFR NON-AFRICAN AMERICAN: >60 ML/MIN
GFR SERPL CREATININE-BSD FRML MDRD: ABNORMAL ML/MIN/{1.73_M2}
GFR SERPL CREATININE-BSD FRML MDRD: ABNORMAL ML/MIN/{1.73_M2}
GLUCOSE BLD-MCNC: 117 MG/DL (ref 70–99)
GLUCOSE URINE: NEGATIVE
HCT VFR BLD CALC: 48.1 % (ref 40.7–50.3)
HEMOGLOBIN: 15.7 G/DL (ref 13–17)
IMMATURE GRANULOCYTES: 1 %
KETONES, URINE: NEGATIVE
LEUKOCYTE ESTERASE, URINE: NEGATIVE
LYMPHOCYTES # BLD: 40 % (ref 24–43)
MCH RBC QN AUTO: 30.4 PG (ref 25.2–33.5)
MCHC RBC AUTO-ENTMCNC: 32.6 G/DL (ref 28.4–34.8)
MCV RBC AUTO: 93.2 FL (ref 82.6–102.9)
MONOCYTES # BLD: 6 % (ref 3–12)
MUCUS: NORMAL
NITRITE, URINE: NEGATIVE
NRBC AUTOMATED: 0 PER 100 WBC
OTHER OBSERVATIONS UA: NORMAL
PDW BLD-RTO: 13.2 % (ref 11.8–14.4)
PH UA: 5 (ref 5–8)
PLATELET # BLD: 277 K/UL (ref 138–453)
PLATELET ESTIMATE: ABNORMAL
PMV BLD AUTO: 9.8 FL (ref 8.1–13.5)
POTASSIUM SERPL-SCNC: 4.1 MMOL/L (ref 3.7–5.3)
PROSTATE SPECIFIC ANTIGEN: 11.85 UG/L
PROTEIN UA: NEGATIVE
RBC # BLD: 5.16 M/UL (ref 4.21–5.77)
RBC # BLD: ABNORMAL 10*6/UL
RBC UA: NORMAL /HPF (ref 0–4)
RENAL EPITHELIAL, UA: NORMAL /HPF
SEDIMENTATION RATE, ERYTHROCYTE: 2 MM (ref 0–10)
SEG NEUTROPHILS: 49 % (ref 36–65)
SEGMENTED NEUTROPHILS ABSOLUTE COUNT: 3.08 K/UL (ref 1.5–8.1)
SODIUM BLD-SCNC: 144 MMOL/L (ref 135–144)
SPECIFIC GRAVITY UA: 1.02 (ref 1–1.03)
TOTAL PROTEIN: 7.1 G/DL (ref 6.4–8.3)
TRICHOMONAS: NORMAL
TSH SERPL DL<=0.05 MIU/L-ACNC: 0.31 MIU/L (ref 0.3–5)
TURBIDITY: CLEAR
URINE HGB: NEGATIVE
UROBILINOGEN, URINE: NORMAL
WBC # BLD: 6.2 K/UL (ref 3.5–11.3)
WBC # BLD: ABNORMAL 10*3/UL
WBC UA: NORMAL /HPF (ref 0–5)
YEAST: NORMAL

## 2020-02-12 PROCEDURE — G8417 CALC BMI ABV UP PARAM F/U: HCPCS | Performed by: INTERNAL MEDICINE

## 2020-02-12 PROCEDURE — G8427 DOCREV CUR MEDS BY ELIG CLIN: HCPCS | Performed by: INTERNAL MEDICINE

## 2020-02-12 PROCEDURE — 99214 OFFICE O/P EST MOD 30 MIN: CPT | Performed by: INTERNAL MEDICINE

## 2020-02-12 PROCEDURE — G8484 FLU IMMUNIZE NO ADMIN: HCPCS | Performed by: INTERNAL MEDICINE

## 2020-02-12 PROCEDURE — 3017F COLORECTAL CA SCREEN DOC REV: CPT | Performed by: INTERNAL MEDICINE

## 2020-02-12 PROCEDURE — 1036F TOBACCO NON-USER: CPT | Performed by: INTERNAL MEDICINE

## 2020-02-12 ASSESSMENT — ENCOUNTER SYMPTOMS
EYES NEGATIVE: 1
COUGH: 1
SINUS PAIN: 0
ALLERGIC/IMMUNOLOGIC NEGATIVE: 1
GASTROINTESTINAL NEGATIVE: 1
SINUS PRESSURE: 0

## 2020-02-12 ASSESSMENT — PATIENT HEALTH QUESTIONNAIRE - PHQ9
2. FEELING DOWN, DEPRESSED OR HOPELESS: 0
SUM OF ALL RESPONSES TO PHQ QUESTIONS 1-9: 0
SUM OF ALL RESPONSES TO PHQ QUESTIONS 1-9: 0
SUM OF ALL RESPONSES TO PHQ9 QUESTIONS 1 & 2: 0
1. LITTLE INTEREST OR PLEASURE IN DOING THINGS: 0

## 2020-02-12 NOTE — PROGRESS NOTES
Subjective:      Patient ID: Piyush Stearns is a 64 y.o. male. He has no symptoms but found to have high PSA      Review of Systems   Constitutional: Negative. HENT: Negative. Negative for congestion, sinus pressure and sinus pain. Eyes: Negative. Respiratory: Positive for cough. Cardiovascular: Negative. Gastrointestinal: Negative. Endocrine: Negative. Genitourinary: Negative. Musculoskeletal: Negative. Allergic/Immunologic: Negative. Neurological: Negative. Hematological: Negative. Psychiatric/Behavioral: Negative. Objective:   Physical Exam  Constitutional:       Appearance: Normal appearance. HENT:      Head: Normocephalic and atraumatic. Right Ear: Tympanic membrane, ear canal and external ear normal.      Left Ear: Tympanic membrane, ear canal and external ear normal. There is impacted cerumen. Nose: Congestion present. Mouth/Throat:      Mouth: Mucous membranes are dry. Eyes:      Extraocular Movements: Extraocular movements intact. Pupils: Pupils are equal, round, and reactive to light. Neck:      Musculoskeletal: Normal range of motion. Cardiovascular:      Rate and Rhythm: Normal rate and regular rhythm. Heart sounds: No murmur. Pulmonary:      Breath sounds: No wheezing or rales. Abdominal:      General: Abdomen is flat. Palpations: Abdomen is soft. Tenderness: There is no abdominal tenderness. Genitourinary:     Penis: Normal.       Scrotum/Testes: Normal.      Prostate: Normal.      Rectum: Normal.      Comments: BPH 2 + with normal consistency  Musculoskeletal: Normal range of motion. Skin:     General: Skin is warm and dry. Neurological:      General: No focal deficit present. Mental Status: He is alert and oriented to person, place, and time. Psychiatric:         Mood and Affect: Mood normal.         Behavior: Behavior normal.         Thought Content:  Thought content normal.         Judgment: Judgment normal.         Assessment / Plan:   Eveline Cueto was seen today for results, health maintenance and other. Diagnoses and all orders for this visit:    Essential hypertension= well controlled     Obstructive sleep apnea syndrome-on SV    Benign prostatic hyperplasia without lower urinary tract symptoms=PSA and lab work       Return in about 6 months (around 8/12/2020) for Follow Up. No orders of the defined types were placed in this encounter. No orders of the defined types were placed in this encounter. Visit Information    Have you changed or started any medications since your last visit including any over-the-counter medicines, vitamins, or herbal medicines? no   Are you having any side effects from any of your medications? -  no  Have you stopped taking any of your medications? Is so, why? -  no    Have you seen any other physician or provider since your last visit? No  Have you had any other diagnostic tests since your last visit? No  Have you been seen in the emergency room and/or had an admission to a hospital since we last saw you? No  Have you had your routine dental cleaning in the past 6 months? yes     Have you activated your [email or web address] account? If not, what are your barriers?  Yes     Patient Care Team:  Maurice Camargo MD as PCP - Alfredo Ramirez MD as PCP - [facility] Empaneled Provider  Emerald Bryan MD as Consulting Physician (Gastroenterology)    Medical History Review  Past Medical, Family, and Social History reviewed and does contribute to the patient presenting condition    Health Maintenance   Topic Date Due    Hepatitis C screen  1960    DTaP/Tdap/Td vaccine (1 - Tdap) 08/15/1973    HIV screen  08/15/1977    Potassium monitoring  06/29/2019    Creatinine monitoring  06/29/2019    Shingles Vaccine (1 of 2) 11/12/2020 (Originally 8/15/2012)    Lipid screen  03/08/2023    Colon cancer screen colonoscopy  12/04/2024    Flu vaccine  Completed    Hepatitis

## 2020-02-14 ENCOUNTER — HOSPITAL ENCOUNTER (OUTPATIENT)
Dept: ULTRASOUND IMAGING | Age: 60
Discharge: HOME | End: 2020-02-16
Payer: COMMERCIAL

## 2020-02-14 PROCEDURE — 76872 US TRANSRECTAL: CPT

## 2020-02-18 ENCOUNTER — OFFICE VISIT (OUTPATIENT)
Dept: UROLOGY | Age: 60
End: 2020-02-18
Payer: COMMERCIAL

## 2020-02-18 VITALS
TEMPERATURE: 98.4 F | SYSTOLIC BLOOD PRESSURE: 130 MMHG | HEIGHT: 65 IN | WEIGHT: 191.6 LBS | DIASTOLIC BLOOD PRESSURE: 84 MMHG | BODY MASS INDEX: 31.92 KG/M2 | HEART RATE: 62 BPM

## 2020-02-18 PROCEDURE — 99204 OFFICE O/P NEW MOD 45 MIN: CPT | Performed by: UROLOGY

## 2020-02-18 PROCEDURE — G8427 DOCREV CUR MEDS BY ELIG CLIN: HCPCS | Performed by: UROLOGY

## 2020-02-18 PROCEDURE — G8484 FLU IMMUNIZE NO ADMIN: HCPCS | Performed by: UROLOGY

## 2020-02-18 PROCEDURE — 1036F TOBACCO NON-USER: CPT | Performed by: UROLOGY

## 2020-02-18 PROCEDURE — G8417 CALC BMI ABV UP PARAM F/U: HCPCS | Performed by: UROLOGY

## 2020-02-18 PROCEDURE — 3017F COLORECTAL CA SCREEN DOC REV: CPT | Performed by: UROLOGY

## 2020-02-18 ASSESSMENT — ENCOUNTER SYMPTOMS
COUGH: 0
NAUSEA: 0
EYE PAIN: 0
WHEEZING: 0
SHORTNESS OF BREATH: 0
DIARRHEA: 0
VOMITING: 0
ABDOMINAL PAIN: 1
EYE REDNESS: 0
BACK PAIN: 0
CONSTIPATION: 0

## 2020-02-18 NOTE — PROGRESS NOTES
[facility]  [facility] - [place]  [place] [address]  Dept: [phone]  Dept Fax: [phone] [facility] Urology Office Note - New patient    Patient:  Devyn Gutierres  YOB: 1960  Date: 2/18/2020    The patient is a 64 y.o. male who presents todayfor evaluation of the following problems:   Chief Complaint   Patient presents with    Elevated PSA    New Patient    referred by Chana Gabriel MD.      HPI  Here as a New Pt for elevated PSA discovered on screening for life insurance. He had had an episode of flu on 2/7/20 and had PSA done 2/12/20. He has no known family Hx of prostate, his MGM had kidney cancer and his MGF had bladder cancer. He has no concerning LUTS- rare frequency. His ERIC was normal per PCP and he had prostate MRI. He has never had a prostate infection. He has a never had a biopsy. (Patient's old records have been requested, reviewed and summarized in today's note.)    Summary of old records: N/A    Additional History: N/A    Procedures Today: N/A    Last several PSA's:  Lab Results   Component Value Date    PSA 11.85 (H) 02/12/2020     Last total testosterone:  No results found for: TESTOSTERONE  Urinalysis today:  No results found for this visit on 02/18/20.     AUA Symptom Score (2/18/2020):  INCOMPLETE EMPTYING: How often have you had the sensation of not emptying your bladder?: Not at all  FREQUENCY: How often do you have to urinate less than every two hours?: Less than 1 to 5 times  INTERMITTENCY: How often have you found you stopped and started again several times when you urinated?: Not at all  URGENCY: How often have you found it difficult to postpone urination?: Not at all  WEAK STREAM: How often have you had a weak urinary stream?: Not at all  STRAINING: How often have you had to strain to start  urination?: Not at all  NOCTURIA: How many times did you typically get up at night to uriniate?: NONE  TOTAL

## 2020-02-18 NOTE — PROGRESS NOTES
Review of Systems   Constitutional: Negative for appetite change, chills and fatigue. Eyes: Negative for pain, redness and visual disturbance. Respiratory: Negative for cough, shortness of breath and wheezing. Cardiovascular: Negative for chest pain and leg swelling. Gastrointestinal: Positive for abdominal pain. Negative for constipation, diarrhea, nausea and vomiting. Genitourinary: Negative for difficulty urinating, dysuria, flank pain, frequency, hematuria and urgency. Musculoskeletal: Negative for back pain, joint swelling and myalgias. Skin: Negative for rash and wound. Neurological: Negative for dizziness, weakness and numbness. Hematological: Does not bruise/bleed easily.

## 2020-02-18 NOTE — LETTER
[facility]  [facility] [address]  Dept: [phone]  Dept Fax: [phone]        2/18/20    Patient: Devyn Gutierres  YOB: 1960    Dear Chana Gabriel MD,    I had the pleasure of seeing one of your patients, Olman Brown today in the office today. Below are the relevant portions of my assessment and plan of care. IMPRESSION:  1. Elevated PSA        PLAN:  He was found to have an elevated PSA of 11, which was a repeat from a previously elevated PSA. Will go forward with a prostate biopsy. Prescriptions Ordered:  No orders of the defined types were placed in this encounter. Orders Placed:  No orders of the defined types were placed in this encounter. Thank you for allowing me to participate in the care of this patient. I will keep you updated on this patient's follow up and I look forward to serving you and your patients again in the future.         Cherie Bush MD

## 2020-03-05 ENCOUNTER — PROCEDURE VISIT (OUTPATIENT)
Dept: UROLOGY | Age: 60
End: 2020-03-05

## 2020-03-05 ENCOUNTER — HOSPITAL ENCOUNTER (OUTPATIENT)
Age: 60
Setting detail: SPECIMEN
Discharge: HOME | End: 2020-03-05
Payer: COMMERCIAL

## 2020-03-05 VITALS
HEART RATE: 56 BPM | SYSTOLIC BLOOD PRESSURE: 118 MMHG | HEIGHT: 65 IN | DIASTOLIC BLOOD PRESSURE: 73 MMHG | BODY MASS INDEX: 31.99 KG/M2 | WEIGHT: 192 LBS | TEMPERATURE: 98.3 F

## 2020-03-05 PROCEDURE — 99999 PR OFFICE/OUTPT VISIT,PROCEDURE ONLY: CPT | Performed by: NURSE PRACTITIONER

## 2020-03-05 NOTE — PROGRESS NOTES
Rectal swab obtained. Discussed pre-procedure  antibiotic therapy - will call with instructions. Discussed pre-procedure directions. Verbalized understanding.

## 2020-03-10 ENCOUNTER — TELEPHONE (OUTPATIENT)
Dept: UROLOGY | Age: 60
End: 2020-03-10

## 2020-03-10 NOTE — TELEPHONE ENCOUNTER
Pt called nurse line left a message wanting to know Fluoroquinolone. Called pt informed him results are not finalized. Will call pt with proper ABX once finalized. Verbal understanding given call ended.

## 2020-03-11 LAB
FLUOROQUINOLONE RESISTANT ORG, CULT: NORMAL
ZZ NTE WITH NAME CLEAN UP: SPECIMEN SOURCE: NORMAL

## 2020-03-12 RX ORDER — SULFAMETHOXAZOLE AND TRIMETHOPRIM 800; 160 MG/1; MG/1
1 TABLET ORAL 2 TIMES DAILY
Qty: 6 TABLET | Refills: 0 | Status: SHIPPED | OUTPATIENT
Start: 2020-03-16 | End: 2020-03-19

## 2020-03-17 ENCOUNTER — HOSPITAL ENCOUNTER (OUTPATIENT)
Age: 60
Setting detail: SPECIMEN
Discharge: HOME | End: 2020-03-17
Payer: COMMERCIAL

## 2020-03-17 ENCOUNTER — PROCEDURE VISIT (OUTPATIENT)
Dept: UROLOGY | Age: 60
End: 2020-03-17
Payer: COMMERCIAL

## 2020-03-17 VITALS — TEMPERATURE: 98.3 F | SYSTOLIC BLOOD PRESSURE: 146 MMHG | DIASTOLIC BLOOD PRESSURE: 87 MMHG | HEART RATE: 60 BPM

## 2020-03-17 PROCEDURE — 76872 US TRANSRECTAL: CPT | Performed by: UROLOGY

## 2020-03-17 PROCEDURE — 55700 PR BIOPSY OF PROSTATE,NEEDLE/PUNCH: CPT | Performed by: UROLOGY

## 2020-03-17 PROCEDURE — 76942 ECHO GUIDE FOR BIOPSY: CPT | Performed by: UROLOGY

## 2020-03-17 NOTE — PROGRESS NOTES
Elevated PSA:  Patient is here today for history of an elevated PSA. PROSTATE U/S AND BIOPSY  Risks and Benefits discussed with patient prior to procedure. As per my instructions, the patient took an antibiotic 1 hour prior to this procedure. He also had a Fleets enema. Surgeon: Gallo Stone MD  3/17/20  Indications for exam: elevated psa  Anesthesia: 1% Lidocaine periprostatic block bilaterally at junction of base of prostate and seminal vesicle. Ultrasound Findings:  Seminal Vesicles: intact bilaterally  Prostate Measurement: 26 grams  Central Zone: Normal echogenic structure  Transitional Zone: Normal echogenic structure  Peripheral Zone: Normal echogenic structure  Bladder: Minimal postvoid residual  Biopsy: Ultrasound guidance used to obtain biopsy cores were taken from each lobe in a sequential fashion. From 6 quadrants all were taken from the right 6 quadrants were taken from the left. All specimens were sent for pathological examination. Biopsy is transrectal with transrectal US done    Postop medications: Bactrim  mg po bid for 3 more doses. Recommendations: Patient has appointment in the office to review results. Postop Prostate Biopsy Instructions:  Patient told to drink plenty of fluids and to take it easy the day of the prostate biopsy. He was encouraged to use sitz baths as needed for relief of rectal discomfort after the biopsy. He was told he may notice blood or a rust color in his semen for several months after the biopsy. He was told to avoid resuming blood thinners or aspirin until the rectal bleeding or visible blood in urine has stopped for at least 48 hours. He should take his antibiotics to completion as prescribed. He was instructed to call our office immediately or to go to the Emergency Room if he experiences a fever over 101, shaking chills or an inability to urinate. Agree with the ROS entered by the MA.

## 2020-03-18 ENCOUNTER — TELEPHONE (OUTPATIENT)
Dept: UROLOGY | Age: 60
End: 2020-03-18

## 2020-03-19 LAB — SURGICAL PATHOLOGY REPORT: NORMAL

## 2020-03-23 ENCOUNTER — TELEPHONE (OUTPATIENT)
Dept: UROLOGY | Age: 60
End: 2020-03-23

## 2020-03-23 NOTE — TELEPHONE ENCOUNTER
phone visit, COVID 19- protocol due to stay at home order   Chanda Mantilla with patient, available for phone visit 9:15-9:45am    Patient confirms all information   # [phone]  9:30am phone appt.

## 2020-03-24 DIAGNOSIS — C61 PROSTATE CANCER (HCC): Primary | ICD-10-CM

## 2020-03-30 ENCOUNTER — HOSPITAL ENCOUNTER (OUTPATIENT)
Dept: NUCLEAR MEDICINE | Age: 60
Discharge: HOME | End: 2020-04-01
Payer: COMMERCIAL

## 2020-03-30 ENCOUNTER — HOSPITAL ENCOUNTER (OUTPATIENT)
Dept: CT IMAGING | Age: 60
Discharge: HOME | End: 2020-04-01
Payer: COMMERCIAL

## 2020-03-30 ENCOUNTER — TELEPHONE (OUTPATIENT)
Dept: INTERNAL MEDICINE CLINIC | Age: 60
End: 2020-03-30

## 2020-03-30 LAB
CREAT SERPL-MCNC: 0.79 MG/DL (ref 0.7–1.2)
GFR AFRICAN AMERICAN: >60 ML/MIN
GFR NON-AFRICAN AMERICAN: >60 ML/MIN
GFR SERPL CREATININE-BSD FRML MDRD: NORMAL ML/MIN/{1.73_M2}
GFR SERPL CREATININE-BSD FRML MDRD: NORMAL ML/MIN/{1.73_M2}

## 2020-03-30 PROCEDURE — 6360000004 HC RX CONTRAST MEDICATION: Performed by: UROLOGY

## 2020-03-30 PROCEDURE — 78306 BONE IMAGING WHOLE BODY: CPT

## 2020-03-30 PROCEDURE — 82565 ASSAY OF CREATININE: CPT

## 2020-03-30 PROCEDURE — 36415 COLL VENOUS BLD VENIPUNCTURE: CPT

## 2020-03-30 PROCEDURE — 2580000003 HC RX 258: Performed by: UROLOGY

## 2020-03-30 PROCEDURE — A9503 TC99M MEDRONATE: HCPCS | Performed by: UROLOGY

## 2020-03-30 PROCEDURE — 3430000000 HC RX DIAGNOSTIC RADIOPHARMACEUTICAL: Performed by: UROLOGY

## 2020-03-30 PROCEDURE — 74177 CT ABD & PELVIS W/CONTRAST: CPT

## 2020-03-30 RX ORDER — SODIUM CHLORIDE 0.9 % (FLUSH) 0.9 %
10 SYRINGE (ML) INJECTION ONCE
Status: COMPLETED | OUTPATIENT
Start: 2020-03-30 | End: 2020-03-30

## 2020-03-30 RX ORDER — SODIUM CHLORIDE 0.9 % (FLUSH) 0.9 %
10 SYRINGE (ML) INJECTION PRN
Status: DISCONTINUED | OUTPATIENT
Start: 2020-03-30 | End: 2020-04-02 | Stop reason: HOSPADM

## 2020-03-30 RX ORDER — TC 99M MEDRONATE 20 MG/10ML
25 INJECTION, POWDER, LYOPHILIZED, FOR SOLUTION INTRAVENOUS
Status: COMPLETED | OUTPATIENT
Start: 2020-03-30 | End: 2020-03-30

## 2020-03-30 RX ORDER — 0.9 % SODIUM CHLORIDE 0.9 %
80 INTRAVENOUS SOLUTION INTRAVENOUS ONCE
Status: COMPLETED | OUTPATIENT
Start: 2020-03-30 | End: 2020-03-30

## 2020-03-30 RX ADMIN — Medication 10 ML: at 09:16

## 2020-03-30 RX ADMIN — IOHEXOL 50 ML: 240 INJECTION, SOLUTION INTRATHECAL; INTRAVASCULAR; INTRAVENOUS; ORAL at 09:16

## 2020-03-30 RX ADMIN — SODIUM CHLORIDE 80 ML: 9 INJECTION, SOLUTION INTRAVENOUS at 09:15

## 2020-03-30 RX ADMIN — TC 99M MEDRONATE 25 MILLICURIE: 20 INJECTION, POWDER, LYOPHILIZED, FOR SOLUTION INTRAVENOUS at 07:54

## 2020-03-30 RX ADMIN — IOVERSOL 75 ML: 741 INJECTION INTRA-ARTERIAL; INTRAVENOUS at 09:16

## 2020-03-30 RX ADMIN — Medication 10 ML: at 07:54

## 2020-03-30 NOTE — TELEPHONE ENCOUNTER
Patient has had labs, Imaging and biopsy done with Urology. However, he would like to talk with you to discuss results. He says he feels more comfortable discussing with you, since he has known you for a long time.       Please call pt  Thank you

## 2020-03-31 ENCOUNTER — TELEPHONE (OUTPATIENT)
Dept: UROLOGY | Age: 60
End: 2020-03-31

## 2020-03-31 NOTE — TELEPHONE ENCOUNTER
Patient called asked what we should do next with surgery/results was asking if Doctor can give him a call to see what to do next.  Patient interested in a E-Visit

## 2020-04-03 ENCOUNTER — TELEPHONE (OUTPATIENT)
Dept: INTERNAL MEDICINE CLINIC | Age: 60
End: 2020-04-03

## 2020-04-03 NOTE — TELEPHONE ENCOUNTER
I spoke to the patient regarding his test results ordered by 's and informed him I would give his office a call to find out if their was a treatment plan for the patient or if someone could give him a call. While on the phone the staff at Dr. Tobi Sellers office states she will talk to the doctor and give the patient a call.   I informed pt and while on the phone with the patient the office staff called him

## 2020-04-03 NOTE — TELEPHONE ENCOUNTER
Pt spoke with provider on 3/30- re; test results- pt states that has not received any information from specialist that he was referred to. is upset that he has heard nothing.

## 2020-04-06 ENCOUNTER — TELEPHONE (OUTPATIENT)
Dept: UROLOGY | Age: 60
End: 2020-04-06

## 2020-04-06 ENCOUNTER — HOSPITAL ENCOUNTER (OUTPATIENT)
Age: 60
Setting detail: SPECIMEN
Discharge: HOME | End: 2020-04-06
Payer: COMMERCIAL

## 2020-04-06 LAB
ALBUMIN SERPL-MCNC: 4 G/DL (ref 3.5–5.2)
ALBUMIN/GLOBULIN RATIO: 1.3 (ref 1–2.5)
ALP BLD-CCNC: 63 U/L (ref 40–129)
ALT SERPL-CCNC: 48 U/L (ref 5–41)
AST SERPL-CCNC: 29 U/L
BILIRUB SERPL-MCNC: 0.4 MG/DL (ref 0.3–1.2)
BILIRUBIN DIRECT: 0.13 MG/DL
BILIRUBIN, INDIRECT: 0.27 MG/DL (ref 0–1)
GLOBULIN: ABNORMAL G/DL (ref 1.5–3.8)
HAV IGM SER IA-ACNC: NONREACTIVE
HEPATITIS B CORE IGM ANTIBODY: NONREACTIVE
HEPATITIS B SURFACE ANTIGEN: NONREACTIVE
HEPATITIS C ANTIBODY: NONREACTIVE
TOTAL PROTEIN: 7.1 G/DL (ref 6.4–8.3)

## 2020-04-07 ENCOUNTER — VIRTUAL VISIT (OUTPATIENT)
Dept: UROLOGY | Age: 60
End: 2020-04-07
Payer: COMMERCIAL

## 2020-04-07 PROCEDURE — 99213 OFFICE O/P EST LOW 20 MIN: CPT | Performed by: UROLOGY

## 2020-04-07 ASSESSMENT — ENCOUNTER SYMPTOMS
WHEEZING: 0
COUGH: 0
EYE PAIN: 0
EYE REDNESS: 0
SHORTNESS OF BREATH: 0
ABDOMINAL PAIN: 0
BACK PAIN: 0
VOMITING: 0
COLOR CHANGE: 0
NAUSEA: 0

## 2020-04-07 NOTE — LETTER
[facility]  [facility] - [address]  Dept: [phone]  Dept Fax: [phone]        4/7/20    Patient: Sarah Masterson  YOB: 1960    Dear Rajesh Manuel MD,    I had the pleasure of seeing one of your patients, Cj Pierce today in the office today. Below are the relevant portions of my assessment and plan of care. IMPRESSION:  1. Prostate cancer ([address]    2. Elevated PSA        PLAN:  Has elevated PSA  Biopsy shows extensive Efrain 9  Met eval was negative. Wants to move forward with RALP  Discussed risks and benefits today. Will arrange for surgery  Return in about 4 weeks (around 5/5/2020) for surgery. Prescriptions Ordered:  No orders of the defined types were placed in this encounter. Orders Placed:  No orders of the defined types were placed in this encounter. Thank you for allowing me to participate in the care of this patient. I will keep you updated on this patient's follow up and I look forward to serving you and your patients again in the future.         Uzair Maldonado MD

## 2020-04-21 ENCOUNTER — TELEPHONE (OUTPATIENT)
Dept: INTERNAL MEDICINE CLINIC | Age: 60
End: 2020-04-21

## 2020-05-12 ENCOUNTER — HOSPITAL ENCOUNTER (OUTPATIENT)
Dept: PREADMISSION TESTING | Age: 60
Discharge: HOME | End: 2020-05-16
Payer: COMMERCIAL

## 2020-05-12 VITALS
SYSTOLIC BLOOD PRESSURE: 134 MMHG | HEART RATE: 56 BPM | BODY MASS INDEX: 30.82 KG/M2 | OXYGEN SATURATION: 96 % | RESPIRATION RATE: 16 BRPM | TEMPERATURE: 98.4 F | DIASTOLIC BLOOD PRESSURE: 85 MMHG | HEIGHT: 65 IN | WEIGHT: 185 LBS

## 2020-05-12 LAB
ABO/RH: NORMAL
ANION GAP SERPL CALCULATED.3IONS-SCNC: 13 MMOL/L (ref 9–17)
ANTIBODY SCREEN: NEGATIVE
ARM BAND NUMBER: NORMAL
BUN BLDV-MCNC: 18 MG/DL (ref 6–20)
CHLORIDE BLD-SCNC: 104 MMOL/L (ref 98–107)
CO2: 24 MMOL/L (ref 20–31)
CREAT SERPL-MCNC: 0.83 MG/DL (ref 0.7–1.2)
EXPIRATION DATE: NORMAL
GFR AFRICAN AMERICAN: >60 ML/MIN
GFR NON-AFRICAN AMERICAN: >60 ML/MIN
GFR SERPL CREATININE-BSD FRML MDRD: NORMAL ML/MIN/{1.73_M2}
GFR SERPL CREATININE-BSD FRML MDRD: NORMAL ML/MIN/{1.73_M2}
GLUCOSE BLD-MCNC: 74 MG/DL (ref 70–99)
HCT VFR BLD CALC: 46.5 % (ref 40.7–50.3)
HEMOGLOBIN: 15.7 G/DL (ref 13–17)
MCH RBC QN AUTO: 30.9 PG (ref 25.2–33.5)
MCHC RBC AUTO-ENTMCNC: 33.8 G/DL (ref 28.4–34.8)
MCV RBC AUTO: 91.5 FL (ref 82.6–102.9)
NRBC AUTOMATED: 0 PER 100 WBC
PDW BLD-RTO: 12.8 % (ref 11.8–14.4)
PLATELET # BLD: 263 K/UL (ref 138–453)
PMV BLD AUTO: 9.6 FL (ref 8.1–13.5)
POTASSIUM SERPL-SCNC: 4.2 MMOL/L (ref 3.7–5.3)
RBC # BLD: 5.08 M/UL (ref 4.21–5.77)
SODIUM BLD-SCNC: 141 MMOL/L (ref 135–144)
WBC # BLD: 7.4 K/UL (ref 3.5–11.3)

## 2020-05-12 PROCEDURE — 86901 BLOOD TYPING SEROLOGIC RH(D): CPT

## 2020-05-12 PROCEDURE — 87086 URINE CULTURE/COLONY COUNT: CPT

## 2020-05-12 PROCEDURE — 86850 RBC ANTIBODY SCREEN: CPT

## 2020-05-12 PROCEDURE — 86900 BLOOD TYPING SEROLOGIC ABO: CPT

## 2020-05-12 PROCEDURE — 84520 ASSAY OF UREA NITROGEN: CPT

## 2020-05-12 PROCEDURE — 36415 COLL VENOUS BLD VENIPUNCTURE: CPT

## 2020-05-12 PROCEDURE — 82947 ASSAY GLUCOSE BLOOD QUANT: CPT

## 2020-05-12 PROCEDURE — 82565 ASSAY OF CREATININE: CPT

## 2020-05-12 PROCEDURE — 85027 COMPLETE CBC AUTOMATED: CPT

## 2020-05-12 PROCEDURE — 80051 ELECTROLYTE PANEL: CPT

## 2020-05-12 PROCEDURE — 93005 ELECTROCARDIOGRAM TRACING: CPT | Performed by: ANESTHESIOLOGY

## 2020-05-12 RX ORDER — SODIUM CHLORIDE, SODIUM LACTATE, POTASSIUM CHLORIDE, CALCIUM CHLORIDE 600; 310; 30; 20 MG/100ML; MG/100ML; MG/100ML; MG/100ML
1000 INJECTION, SOLUTION INTRAVENOUS CONTINUOUS
Status: CANCELLED | OUTPATIENT
Start: 2020-05-12

## 2020-05-12 NOTE — H&P
History and Physical    Pt Name: Christal Brunner  MRN: [id number]  YOB: 1960  Date of evaluation: 2020  Primary Care Physician: Prashanth Noriega MD    SUBJECTIVE:   History of Chief Complaint:    Christal Brunner is a 64 y.o. male who presents for PAT appointment. Patient complains of prostate cancer. He reports having an elevated PSA that was done as a screening and then had a biopsy on 3/17/20 that came back positive for cancer. He denies any urological symptoms. Patient has been scheduled for laparoscopic robotic prostatectomy, pelvic lymph node dissection. Allergies  is allergic to azithromycin and seasonal.  Medications  Prior to Admission medications    Medication Sig Start Date End Date Taking? Authorizing Provider   metoprolol tartrate (LOPRESSOR) 25 MG tablet Take 0.5 tablets by mouth 2 times daily  Patient taking differently: Take 12.5 mg by mouth daily  19  Yes Asia Shanks MD   clonazePAM (KLONOPIN) 1 MG tablet Take 1 mg by mouth nightly. 10/29/19  Yes Historical Provider, MD     Past Medical History    has a past medical history of Anxiety state, unspecified, Central sleep apnea, Cholelithiasis, History of bronchitis, History of rectal bleeding, Hypoglycemia, Other and unspecified angina pectoris, Prostate cancer ([facility] [address] Unspecified essential hypertension, and Vision abnormalities. Past Surgical History   has a past surgical history that includes Colonoscopy (14); Prostate biopsy (2020); Cholecystectomy, laparoscopic (10/2016); and lipoma resection (Left, APPROX. ). Social History   reports that he has never smoked. He has never used smokeless tobacco.    reports current alcohol use. reports no history of drug use.    Marital Status    Children   Occupation retired [employer]  Family History  Family Status   Relation Name Status    Father      Mother      Sister  Alive    [place]      MGF       family history

## 2020-05-12 NOTE — H&P (VIEW-ONLY)
bilateral lower extremities. Strength of 5/5 bilateral upper extremities. Strength 5/5 bilateral lower extremities. VASCULAR:  Brisk cap refill bilateral fingers. Radial pulse 2+ bilaterally. No edema or varicosities bilateral lower extremities  NEUROLOGIC: CN II-XII are grossly intact. Gait is smooth, rhythmic and effortless     Testing:   EK20  Labs pending: drawn 2020     IMPRESSIONS:   Prostate cancer      Diagnosis Date    Anxiety state, unspecified     Central sleep apnea     uses SV machine    Cholelithiasis     History of bronchitis     History of rectal bleeding 2014    Hypoglycemia     Other and unspecified angina pectoris     2009-stress related    Prostate cancer ([facility] [address]     Unspecified essential hypertension     Vision abnormalities    1.    PLANS:   LAPAROSCOPIC XI ROBOTIC PROSTATECTOMY, PELVIC LYMPHNODE DISSECTION    LAKEISHA CHOUDHURY- CNP  Electronically signed 2020 at 11:57 AM

## 2020-05-13 LAB
CULTURE: NO GROWTH
EKG ATRIAL RATE: 55 BPM
EKG P AXIS: 38 DEGREES
EKG P-R INTERVAL: 168 MS
EKG Q-T INTERVAL: 412 MS
EKG QRS DURATION: 86 MS
EKG QTC CALCULATION (BAZETT): 394 MS
EKG R AXIS: 13 DEGREES
EKG T AXIS: 20 DEGREES
EKG VENTRICULAR RATE: 55 BPM
Lab: NORMAL
SPECIMEN DESCRIPTION: NORMAL

## 2020-05-13 PROCEDURE — 93010 ELECTROCARDIOGRAM REPORT: CPT | Performed by: INTERNAL MEDICINE

## 2020-05-16 ENCOUNTER — HOSPITAL ENCOUNTER (OUTPATIENT)
Dept: PREADMISSION TESTING | Age: 60
Discharge: HOME | End: 2020-05-20
Payer: COMMERCIAL

## 2020-05-16 PROCEDURE — U0003 INFECTIOUS AGENT DETECTION BY NUCLEIC ACID (DNA OR RNA); SEVERE ACUTE RESPIRATORY SYNDROME CORONAVIRUS 2 (SARS-COV-2) (CORONAVIRUS DISEASE [COVID-19]), AMPLIFIED PROBE TECHNIQUE, MAKING USE OF HIGH THROUGHPUT TECHNOLOGIES AS DESCRIBED BY CMS-2020-01-R: HCPCS

## 2020-05-17 LAB
SARS-COV-2, PCR: NORMAL
SARS-COV-2, RAPID: NORMAL
SARS-COV-2: NOT DETECTED
SOURCE: NORMAL

## 2020-05-18 ENCOUNTER — TELEPHONE (OUTPATIENT)
Dept: PRIMARY CARE CLINIC | Age: 60
End: 2020-05-18

## 2020-05-19 ENCOUNTER — ANESTHESIA (OUTPATIENT)
Dept: OPERATING ROOM | Age: 60
End: 2020-05-19
Payer: COMMERCIAL

## 2020-05-19 ENCOUNTER — HOSPITAL ENCOUNTER (OUTPATIENT)
Age: 60
Setting detail: OBSERVATION
Discharge: HOME | End: 2020-05-20
Attending: UROLOGY | Admitting: UROLOGY
Payer: COMMERCIAL

## 2020-05-19 ENCOUNTER — ANESTHESIA EVENT (OUTPATIENT)
Dept: OPERATING ROOM | Age: 60
End: 2020-05-19
Payer: COMMERCIAL

## 2020-05-19 VITALS — TEMPERATURE: 97.4 F | OXYGEN SATURATION: 98 % | DIASTOLIC BLOOD PRESSURE: 85 MMHG | SYSTOLIC BLOOD PRESSURE: 112 MMHG

## 2020-05-19 PROBLEM — Z90.79 S/P PROSTATECTOMY: Status: ACTIVE | Noted: 2020-05-19

## 2020-05-19 PROBLEM — C61 PROSTATE CANCER (HCC): Status: ACTIVE | Noted: 2020-05-19

## 2020-05-19 LAB
ANION GAP SERPL CALCULATED.3IONS-SCNC: 12 MMOL/L (ref 9–17)
BUN BLDV-MCNC: 18 MG/DL (ref 6–20)
BUN/CREAT BLD: ABNORMAL (ref 9–20)
CALCIUM SERPL-MCNC: 8.1 MG/DL (ref 8.6–10.4)
CHLORIDE BLD-SCNC: 103 MMOL/L (ref 98–107)
CO2: 19 MMOL/L (ref 20–31)
CREAT SERPL-MCNC: 0.78 MG/DL (ref 0.7–1.2)
GFR AFRICAN AMERICAN: >60 ML/MIN
GFR NON-AFRICAN AMERICAN: >60 ML/MIN
GFR SERPL CREATININE-BSD FRML MDRD: ABNORMAL ML/MIN/{1.73_M2}
GFR SERPL CREATININE-BSD FRML MDRD: ABNORMAL ML/MIN/{1.73_M2}
GLUCOSE BLD-MCNC: 143 MG/DL (ref 75–110)
GLUCOSE BLD-MCNC: 159 MG/DL (ref 70–99)
HCT VFR BLD CALC: 43.9 % (ref 40.7–50.3)
HEMOGLOBIN: 14.6 G/DL (ref 13–17)
MCH RBC QN AUTO: 30.7 PG (ref 25.2–33.5)
MCHC RBC AUTO-ENTMCNC: 33.3 G/DL (ref 28.4–34.8)
MCV RBC AUTO: 92.4 FL (ref 82.6–102.9)
NRBC AUTOMATED: 0 PER 100 WBC
PDW BLD-RTO: 12.6 % (ref 11.8–14.4)
PLATELET # BLD: 261 K/UL (ref 138–453)
PMV BLD AUTO: 9.4 FL (ref 8.1–13.5)
POTASSIUM SERPL-SCNC: 4.4 MMOL/L (ref 3.7–5.3)
RBC # BLD: 4.75 M/UL (ref 4.21–5.77)
SODIUM BLD-SCNC: 134 MMOL/L (ref 135–144)
WBC # BLD: 11.5 K/UL (ref 3.5–11.3)

## 2020-05-19 PROCEDURE — 6370000000 HC RX 637 (ALT 250 FOR IP): Performed by: UROLOGY

## 2020-05-19 PROCEDURE — 2580000003 HC RX 258

## 2020-05-19 PROCEDURE — S2900 ROBOTIC SURGICAL SYSTEM: HCPCS | Performed by: UROLOGY

## 2020-05-19 PROCEDURE — 2709999900 HC NON-CHARGEABLE SUPPLY: Performed by: UROLOGY

## 2020-05-19 PROCEDURE — 3700000000 HC ANESTHESIA ATTENDED CARE: Performed by: UROLOGY

## 2020-05-19 PROCEDURE — 85027 COMPLETE CBC AUTOMATED: CPT

## 2020-05-19 PROCEDURE — 88305 TISSUE EXAM BY PATHOLOGIST: CPT

## 2020-05-19 PROCEDURE — 2580000003 HC RX 258: Performed by: STUDENT IN AN ORGANIZED HEALTH CARE EDUCATION/TRAINING PROGRAM

## 2020-05-19 PROCEDURE — 6360000002 HC RX W HCPCS: Performed by: STUDENT IN AN ORGANIZED HEALTH CARE EDUCATION/TRAINING PROGRAM

## 2020-05-19 PROCEDURE — 3600000009 HC SURGERY ROBOT BASE: Performed by: UROLOGY

## 2020-05-19 PROCEDURE — 7100000001 HC PACU RECOVERY - ADDTL 15 MIN: Performed by: UROLOGY

## 2020-05-19 PROCEDURE — 2500000003 HC RX 250 WO HCPCS

## 2020-05-19 PROCEDURE — 6360000002 HC RX W HCPCS

## 2020-05-19 PROCEDURE — 88307 TISSUE EXAM BY PATHOLOGIST: CPT

## 2020-05-19 PROCEDURE — 3700000001 HC ADD 15 MINUTES (ANESTHESIA): Performed by: UROLOGY

## 2020-05-19 PROCEDURE — 3600000019 HC SURGERY ROBOT ADDTL 15MIN: Performed by: UROLOGY

## 2020-05-19 PROCEDURE — 87086 URINE CULTURE/COLONY COUNT: CPT

## 2020-05-19 PROCEDURE — 2580000003 HC RX 258: Performed by: ANESTHESIOLOGY

## 2020-05-19 PROCEDURE — 2580000003 HC RX 258: Performed by: UROLOGY

## 2020-05-19 PROCEDURE — 82947 ASSAY GLUCOSE BLOOD QUANT: CPT

## 2020-05-19 PROCEDURE — 88331 PATH CONSLTJ SURG 1 BLK 1SPC: CPT

## 2020-05-19 PROCEDURE — 2720000010 HC SURG SUPPLY STERILE: Performed by: UROLOGY

## 2020-05-19 PROCEDURE — 88309 TISSUE EXAM BY PATHOLOGIST: CPT

## 2020-05-19 PROCEDURE — 7100000000 HC PACU RECOVERY - FIRST 15 MIN: Performed by: UROLOGY

## 2020-05-19 PROCEDURE — 80048 BASIC METABOLIC PNL TOTAL CA: CPT

## 2020-05-19 PROCEDURE — 6370000000 HC RX 637 (ALT 250 FOR IP): Performed by: STUDENT IN AN ORGANIZED HEALTH CARE EDUCATION/TRAINING PROGRAM

## 2020-05-19 PROCEDURE — 6360000002 HC RX W HCPCS: Performed by: ANESTHESIOLOGY

## 2020-05-19 PROCEDURE — G0378 HOSPITAL OBSERVATION PER HR: HCPCS

## 2020-05-19 RX ORDER — SODIUM CHLORIDE 9 MG/ML
INJECTION, SOLUTION INTRAVENOUS CONTINUOUS
Status: DISCONTINUED | OUTPATIENT
Start: 2020-05-19 | End: 2020-05-20

## 2020-05-19 RX ORDER — KETOROLAC TROMETHAMINE 30 MG/ML
INJECTION, SOLUTION INTRAMUSCULAR; INTRAVENOUS PRN
Status: DISCONTINUED | OUTPATIENT
Start: 2020-05-19 | End: 2020-05-19 | Stop reason: SDUPTHER

## 2020-05-19 RX ORDER — MIDAZOLAM HYDROCHLORIDE 1 MG/ML
INJECTION INTRAMUSCULAR; INTRAVENOUS PRN
Status: DISCONTINUED | OUTPATIENT
Start: 2020-05-19 | End: 2020-05-19 | Stop reason: SDUPTHER

## 2020-05-19 RX ORDER — ROCURONIUM BROMIDE 10 MG/ML
INJECTION, SOLUTION INTRAVENOUS PRN
Status: DISCONTINUED | OUTPATIENT
Start: 2020-05-19 | End: 2020-05-19 | Stop reason: SDUPTHER

## 2020-05-19 RX ORDER — METOPROLOL TARTRATE 5 MG/5ML
INJECTION INTRAVENOUS PRN
Status: DISCONTINUED | OUTPATIENT
Start: 2020-05-19 | End: 2020-05-19 | Stop reason: SDUPTHER

## 2020-05-19 RX ORDER — FENTANYL CITRATE 50 UG/ML
25 INJECTION, SOLUTION INTRAMUSCULAR; INTRAVENOUS EVERY 5 MIN PRN
Status: DISCONTINUED | OUTPATIENT
Start: 2020-05-19 | End: 2020-05-19 | Stop reason: HOSPADM

## 2020-05-19 RX ORDER — NEOSTIGMINE METHYLSULFATE 5 MG/5 ML
SYRINGE (ML) INTRAVENOUS PRN
Status: DISCONTINUED | OUTPATIENT
Start: 2020-05-19 | End: 2020-05-19 | Stop reason: SDUPTHER

## 2020-05-19 RX ORDER — PROPOFOL 10 MG/ML
INJECTION, EMULSION INTRAVENOUS PRN
Status: DISCONTINUED | OUTPATIENT
Start: 2020-05-19 | End: 2020-05-19 | Stop reason: SDUPTHER

## 2020-05-19 RX ORDER — DEXAMETHASONE SODIUM PHOSPHATE 10 MG/ML
INJECTION INTRAMUSCULAR; INTRAVENOUS PRN
Status: DISCONTINUED | OUTPATIENT
Start: 2020-05-19 | End: 2020-05-19 | Stop reason: SDUPTHER

## 2020-05-19 RX ORDER — ONDANSETRON 2 MG/ML
4 INJECTION INTRAMUSCULAR; INTRAVENOUS
Status: DISCONTINUED | OUTPATIENT
Start: 2020-05-19 | End: 2020-05-19 | Stop reason: HOSPADM

## 2020-05-19 RX ORDER — ONDANSETRON 2 MG/ML
INJECTION INTRAMUSCULAR; INTRAVENOUS PRN
Status: DISCONTINUED | OUTPATIENT
Start: 2020-05-19 | End: 2020-05-19 | Stop reason: SDUPTHER

## 2020-05-19 RX ORDER — SODIUM CHLORIDE, SODIUM LACTATE, POTASSIUM CHLORIDE, CALCIUM CHLORIDE 600; 310; 30; 20 MG/100ML; MG/100ML; MG/100ML; MG/100ML
1000 INJECTION, SOLUTION INTRAVENOUS CONTINUOUS
Status: DISCONTINUED | OUTPATIENT
Start: 2020-05-19 | End: 2020-05-19

## 2020-05-19 RX ORDER — HEPARIN SODIUM 5000 [USP'U]/ML
5000 INJECTION, SOLUTION INTRAVENOUS; SUBCUTANEOUS ONCE
Status: COMPLETED | OUTPATIENT
Start: 2020-05-19 | End: 2020-05-19

## 2020-05-19 RX ORDER — MAGNESIUM HYDROXIDE 1200 MG/15ML
LIQUID ORAL PRN
Status: DISCONTINUED | OUTPATIENT
Start: 2020-05-19 | End: 2020-05-19 | Stop reason: ALTCHOICE

## 2020-05-19 RX ORDER — OXYCODONE HYDROCHLORIDE AND ACETAMINOPHEN 5; 325 MG/1; MG/1
2 TABLET ORAL EVERY 6 HOURS PRN
Status: DISCONTINUED | OUTPATIENT
Start: 2020-05-19 | End: 2020-05-20 | Stop reason: HOSPADM

## 2020-05-19 RX ORDER — FENTANYL CITRATE 50 UG/ML
50 INJECTION, SOLUTION INTRAMUSCULAR; INTRAVENOUS EVERY 5 MIN PRN
Status: DISCONTINUED | OUTPATIENT
Start: 2020-05-19 | End: 2020-05-19 | Stop reason: HOSPADM

## 2020-05-19 RX ORDER — OXYCODONE HYDROCHLORIDE AND ACETAMINOPHEN 5; 325 MG/1; MG/1
1 TABLET ORAL EVERY 6 HOURS PRN
Status: DISCONTINUED | OUTPATIENT
Start: 2020-05-19 | End: 2020-05-20 | Stop reason: HOSPADM

## 2020-05-19 RX ORDER — PHENYLEPHRINE HYDROCHLORIDE 10 MG/ML
INJECTION INTRAVENOUS PRN
Status: DISCONTINUED | OUTPATIENT
Start: 2020-05-19 | End: 2020-05-19 | Stop reason: SDUPTHER

## 2020-05-19 RX ORDER — SODIUM CHLORIDE 0.9 % (FLUSH) 0.9 %
10 SYRINGE (ML) INJECTION EVERY 12 HOURS SCHEDULED
Status: DISCONTINUED | OUTPATIENT
Start: 2020-05-19 | End: 2020-05-20 | Stop reason: HOSPADM

## 2020-05-19 RX ORDER — ONDANSETRON 2 MG/ML
4 INJECTION INTRAMUSCULAR; INTRAVENOUS EVERY 6 HOURS PRN
Status: DISCONTINUED | OUTPATIENT
Start: 2020-05-19 | End: 2020-05-20 | Stop reason: HOSPADM

## 2020-05-19 RX ORDER — LIDOCAINE HYDROCHLORIDE 10 MG/ML
INJECTION, SOLUTION EPIDURAL; INFILTRATION; INTRACAUDAL; PERINEURAL PRN
Status: DISCONTINUED | OUTPATIENT
Start: 2020-05-19 | End: 2020-05-19 | Stop reason: SDUPTHER

## 2020-05-19 RX ORDER — CLONAZEPAM 1 MG/1
1 TABLET ORAL NIGHTLY
Status: DISCONTINUED | OUTPATIENT
Start: 2020-05-19 | End: 2020-05-20 | Stop reason: HOSPADM

## 2020-05-19 RX ORDER — SODIUM CHLORIDE 0.9 % (FLUSH) 0.9 %
10 SYRINGE (ML) INJECTION PRN
Status: DISCONTINUED | OUTPATIENT
Start: 2020-05-19 | End: 2020-05-20 | Stop reason: HOSPADM

## 2020-05-19 RX ORDER — SODIUM CHLORIDE, SODIUM LACTATE, POTASSIUM CHLORIDE, CALCIUM CHLORIDE 600; 310; 30; 20 MG/100ML; MG/100ML; MG/100ML; MG/100ML
INJECTION, SOLUTION INTRAVENOUS CONTINUOUS PRN
Status: DISCONTINUED | OUTPATIENT
Start: 2020-05-19 | End: 2020-05-19 | Stop reason: SDUPTHER

## 2020-05-19 RX ORDER — POLYETHYLENE GLYCOL 3350 17 G/17G
17 POWDER, FOR SOLUTION ORAL DAILY
Status: DISCONTINUED | OUTPATIENT
Start: 2020-05-19 | End: 2020-05-20 | Stop reason: HOSPADM

## 2020-05-19 RX ORDER — FENTANYL CITRATE 50 UG/ML
INJECTION, SOLUTION INTRAMUSCULAR; INTRAVENOUS PRN
Status: DISCONTINUED | OUTPATIENT
Start: 2020-05-19 | End: 2020-05-19 | Stop reason: SDUPTHER

## 2020-05-19 RX ORDER — GLYCOPYRROLATE 1 MG/5 ML
SYRINGE (ML) INTRAVENOUS PRN
Status: DISCONTINUED | OUTPATIENT
Start: 2020-05-19 | End: 2020-05-19 | Stop reason: SDUPTHER

## 2020-05-19 RX ORDER — MEPERIDINE HYDROCHLORIDE 50 MG/ML
12.5 INJECTION INTRAMUSCULAR; INTRAVENOUS; SUBCUTANEOUS EVERY 5 MIN PRN
Status: DISCONTINUED | OUTPATIENT
Start: 2020-05-19 | End: 2020-05-19 | Stop reason: HOSPADM

## 2020-05-19 RX ORDER — MORPHINE SULFATE 2 MG/ML
2 INJECTION, SOLUTION INTRAMUSCULAR; INTRAVENOUS
Status: DISCONTINUED | OUTPATIENT
Start: 2020-05-19 | End: 2020-05-20 | Stop reason: HOSPADM

## 2020-05-19 RX ORDER — ULTRASOUND COUPLING MEDIUM
GEL (GRAM) TOPICAL PRN
Status: DISCONTINUED | OUTPATIENT
Start: 2020-05-19 | End: 2020-05-19 | Stop reason: ALTCHOICE

## 2020-05-19 RX ADMIN — DEXAMETHASONE SODIUM PHOSPHATE 10 MG: 10 INJECTION INTRAMUSCULAR; INTRAVENOUS at 08:42

## 2020-05-19 RX ADMIN — SODIUM CHLORIDE, POTASSIUM CHLORIDE, SODIUM LACTATE AND CALCIUM CHLORIDE 1000 ML: 600; 310; 30; 20 INJECTION, SOLUTION INTRAVENOUS at 06:51

## 2020-05-19 RX ADMIN — OXYCODONE HYDROCHLORIDE AND ACETAMINOPHEN 1 TABLET: 5; 325 TABLET ORAL at 18:02

## 2020-05-19 RX ADMIN — MORPHINE SULFATE 2 MG: 2 INJECTION, SOLUTION INTRAMUSCULAR; INTRAVENOUS at 15:35

## 2020-05-19 RX ADMIN — PHENYLEPHRINE HYDROCHLORIDE 200 MCG: 10 INJECTION INTRAVENOUS at 08:00

## 2020-05-19 RX ADMIN — ROCURONIUM BROMIDE 10 MG: 10 INJECTION INTRAVENOUS at 10:24

## 2020-05-19 RX ADMIN — ROCURONIUM BROMIDE 30 MG: 10 INJECTION INTRAVENOUS at 08:44

## 2020-05-19 RX ADMIN — DEXTROSE MONOHYDRATE 2 G: 50 INJECTION, SOLUTION INTRAVENOUS at 17:54

## 2020-05-19 RX ADMIN — Medication 0.8 MG: at 11:01

## 2020-05-19 RX ADMIN — PROPOFOL 160 MG: 10 INJECTION, EMULSION INTRAVENOUS at 07:42

## 2020-05-19 RX ADMIN — FENTANYL CITRATE 25 MCG: 50 INJECTION INTRAMUSCULAR; INTRAVENOUS at 12:39

## 2020-05-19 RX ADMIN — FENTANYL CITRATE 100 MCG: 50 INJECTION, SOLUTION INTRAMUSCULAR; INTRAVENOUS at 07:42

## 2020-05-19 RX ADMIN — PHENYLEPHRINE HYDROCHLORIDE 200 MCG: 10 INJECTION INTRAVENOUS at 08:02

## 2020-05-19 RX ADMIN — CEFAZOLIN 2 G: 10 INJECTION, POWDER, FOR SOLUTION INTRAVENOUS at 08:31

## 2020-05-19 RX ADMIN — ROCURONIUM BROMIDE 50 MG: 10 INJECTION INTRAVENOUS at 07:42

## 2020-05-19 RX ADMIN — FENTANYL CITRATE 50 MCG: 50 INJECTION, SOLUTION INTRAMUSCULAR; INTRAVENOUS at 09:27

## 2020-05-19 RX ADMIN — HEPARIN SODIUM 5000 UNITS: 5000 INJECTION INTRAVENOUS; SUBCUTANEOUS at 07:10

## 2020-05-19 RX ADMIN — ROCURONIUM BROMIDE 20 MG: 10 INJECTION INTRAVENOUS at 07:16

## 2020-05-19 RX ADMIN — MIDAZOLAM HYDROCHLORIDE 2 MG: 1 INJECTION, SOLUTION INTRAMUSCULAR; INTRAVENOUS at 07:38

## 2020-05-19 RX ADMIN — FENTANYL CITRATE 25 MCG: 50 INJECTION INTRAMUSCULAR; INTRAVENOUS at 12:40

## 2020-05-19 RX ADMIN — PROPOFOL 40 MG: 10 INJECTION, EMULSION INTRAVENOUS at 07:49

## 2020-05-19 RX ADMIN — LIDOCAINE HYDROCHLORIDE 50 MG: 10 INJECTION, SOLUTION EPIDURAL; INFILTRATION; INTRACAUDAL; PERINEURAL at 07:42

## 2020-05-19 RX ADMIN — ROCURONIUM BROMIDE 10 MG: 10 INJECTION INTRAVENOUS at 10:49

## 2020-05-19 RX ADMIN — FENTANYL CITRATE 25 MCG: 50 INJECTION INTRAMUSCULAR; INTRAVENOUS at 14:02

## 2020-05-19 RX ADMIN — PHENYLEPHRINE HYDROCHLORIDE 200 MCG: 10 INJECTION INTRAVENOUS at 08:25

## 2020-05-19 RX ADMIN — FENTANYL CITRATE 50 MCG: 50 INJECTION, SOLUTION INTRAMUSCULAR; INTRAVENOUS at 10:06

## 2020-05-19 RX ADMIN — PHENYLEPHRINE HYDROCHLORIDE 200 MCG: 10 INJECTION INTRAVENOUS at 08:31

## 2020-05-19 RX ADMIN — SODIUM CHLORIDE, POTASSIUM CHLORIDE, SODIUM LACTATE AND CALCIUM CHLORIDE: 600; 310; 30; 20 INJECTION, SOLUTION INTRAVENOUS at 07:55

## 2020-05-19 RX ADMIN — KETOROLAC TROMETHAMINE 30 MG: 30 INJECTION, SOLUTION INTRAMUSCULAR at 11:05

## 2020-05-19 RX ADMIN — PHENYLEPHRINE HYDROCHLORIDE 100 MCG: 10 INJECTION INTRAVENOUS at 07:55

## 2020-05-19 RX ADMIN — FENTANYL CITRATE 50 MCG: 50 INJECTION INTRAMUSCULAR; INTRAVENOUS at 11:38

## 2020-05-19 RX ADMIN — FENTANYL CITRATE 50 MCG: 50 INJECTION, SOLUTION INTRAMUSCULAR; INTRAVENOUS at 07:49

## 2020-05-19 RX ADMIN — SODIUM CHLORIDE: 9 INJECTION, SOLUTION INTRAVENOUS at 16:28

## 2020-05-19 RX ADMIN — FENTANYL CITRATE 50 MCG: 50 INJECTION INTRAMUSCULAR; INTRAVENOUS at 11:52

## 2020-05-19 RX ADMIN — METOPROLOL TARTRATE 2.5 MG: 1 INJECTION, SOLUTION INTRAVENOUS at 09:04

## 2020-05-19 RX ADMIN — ROCURONIUM BROMIDE 10 MG: 10 INJECTION INTRAVENOUS at 09:45

## 2020-05-19 RX ADMIN — Medication 5 MG: at 11:02

## 2020-05-19 RX ADMIN — PHENYLEPHRINE HYDROCHLORIDE 100 MCG: 10 INJECTION INTRAVENOUS at 07:53

## 2020-05-19 RX ADMIN — ROCURONIUM BROMIDE 10 MG: 10 INJECTION INTRAVENOUS at 09:58

## 2020-05-19 RX ADMIN — SODIUM CHLORIDE, POTASSIUM CHLORIDE, SODIUM LACTATE AND CALCIUM CHLORIDE 1000 ML: 600; 310; 30; 20 INJECTION, SOLUTION INTRAVENOUS at 13:27

## 2020-05-19 RX ADMIN — ONDANSETRON 4 MG: 2 INJECTION, SOLUTION INTRAMUSCULAR; INTRAVENOUS at 10:57

## 2020-05-19 ASSESSMENT — PULMONARY FUNCTION TESTS
PIF_VALUE: 1
PIF_VALUE: 36
PIF_VALUE: 37
PIF_VALUE: 10
PIF_VALUE: 21
PIF_VALUE: 35
PIF_VALUE: 23
PIF_VALUE: 7
PIF_VALUE: 37
PIF_VALUE: 21
PIF_VALUE: 22
PIF_VALUE: 21
PIF_VALUE: 36
PIF_VALUE: 37
PIF_VALUE: 38
PIF_VALUE: 37
PIF_VALUE: 36
PIF_VALUE: 2
PIF_VALUE: 32
PIF_VALUE: 36
PIF_VALUE: 22
PIF_VALUE: 28
PIF_VALUE: 37
PIF_VALUE: 20
PIF_VALUE: 36
PIF_VALUE: 36
PIF_VALUE: 23
PIF_VALUE: 21
PIF_VALUE: 18
PIF_VALUE: 35
PIF_VALUE: 31
PIF_VALUE: 36
PIF_VALUE: 37
PIF_VALUE: 36
PIF_VALUE: 22
PIF_VALUE: 35
PIF_VALUE: 22
PIF_VALUE: 35
PIF_VALUE: 36
PIF_VALUE: 37
PIF_VALUE: 35
PIF_VALUE: 37
PIF_VALUE: 36
PIF_VALUE: 22
PIF_VALUE: 26
PIF_VALUE: 36
PIF_VALUE: 37
PIF_VALUE: 21
PIF_VALUE: 37
PIF_VALUE: 21
PIF_VALUE: 21
PIF_VALUE: 4
PIF_VALUE: 37
PIF_VALUE: 35
PIF_VALUE: 21
PIF_VALUE: 21
PIF_VALUE: 35
PIF_VALUE: 21
PIF_VALUE: 0
PIF_VALUE: 37
PIF_VALUE: 37
PIF_VALUE: 22
PIF_VALUE: 37
PIF_VALUE: 37
PIF_VALUE: 21
PIF_VALUE: 37
PIF_VALUE: 21
PIF_VALUE: 37
PIF_VALUE: 36
PIF_VALUE: 35
PIF_VALUE: 36
PIF_VALUE: 36
PIF_VALUE: 22
PIF_VALUE: 36
PIF_VALUE: 36
PIF_VALUE: 22
PIF_VALUE: 21
PIF_VALUE: 24
PIF_VALUE: 21
PIF_VALUE: 39
PIF_VALUE: 21
PIF_VALUE: 36
PIF_VALUE: 37
PIF_VALUE: 36
PIF_VALUE: 29
PIF_VALUE: 37
PIF_VALUE: 22
PIF_VALUE: 36
PIF_VALUE: 37
PIF_VALUE: 21
PIF_VALUE: 37
PIF_VALUE: 34
PIF_VALUE: 31
PIF_VALUE: 35
PIF_VALUE: 20
PIF_VALUE: 37
PIF_VALUE: 21
PIF_VALUE: 4
PIF_VALUE: 37
PIF_VALUE: 0
PIF_VALUE: 20
PIF_VALUE: 21
PIF_VALUE: 1
PIF_VALUE: 36
PIF_VALUE: 21
PIF_VALUE: 3
PIF_VALUE: 21
PIF_VALUE: 21
PIF_VALUE: 22
PIF_VALUE: 21
PIF_VALUE: 21
PIF_VALUE: 33
PIF_VALUE: 21
PIF_VALUE: 37
PIF_VALUE: 36
PIF_VALUE: 21
PIF_VALUE: 0
PIF_VALUE: 22
PIF_VALUE: 20
PIF_VALUE: 37
PIF_VALUE: 36
PIF_VALUE: 37
PIF_VALUE: 37
PIF_VALUE: 35
PIF_VALUE: 36
PIF_VALUE: 20
PIF_VALUE: 23
PIF_VALUE: 36
PIF_VALUE: 21
PIF_VALUE: 35
PIF_VALUE: 21
PIF_VALUE: 36
PIF_VALUE: 20
PIF_VALUE: 21
PIF_VALUE: 20
PIF_VALUE: 22
PIF_VALUE: 38
PIF_VALUE: 4
PIF_VALUE: 37
PIF_VALUE: 21
PIF_VALUE: 18
PIF_VALUE: 21
PIF_VALUE: 35
PIF_VALUE: 22
PIF_VALUE: 36
PIF_VALUE: 21
PIF_VALUE: 36
PIF_VALUE: 37
PIF_VALUE: 35
PIF_VALUE: 21
PIF_VALUE: 33
PIF_VALUE: 27
PIF_VALUE: 37
PIF_VALUE: 22
PIF_VALUE: 18
PIF_VALUE: 35
PIF_VALUE: 36
PIF_VALUE: 37
PIF_VALUE: 3
PIF_VALUE: 36
PIF_VALUE: 22
PIF_VALUE: 35
PIF_VALUE: 20
PIF_VALUE: 37
PIF_VALUE: 22
PIF_VALUE: 36
PIF_VALUE: 22
PIF_VALUE: 21
PIF_VALUE: 36
PIF_VALUE: 1
PIF_VALUE: 21
PIF_VALUE: 36
PIF_VALUE: 13
PIF_VALUE: 1
PIF_VALUE: 2
PIF_VALUE: 1
PIF_VALUE: 36
PIF_VALUE: 36
PIF_VALUE: 20
PIF_VALUE: 20
PIF_VALUE: 35
PIF_VALUE: 21
PIF_VALUE: 21
PIF_VALUE: 31
PIF_VALUE: 21
PIF_VALUE: 37
PIF_VALUE: 37
PIF_VALUE: 24
PIF_VALUE: 22
PIF_VALUE: 20
PIF_VALUE: 21
PIF_VALUE: 20
PIF_VALUE: 37
PIF_VALUE: 36
PIF_VALUE: 21
PIF_VALUE: 36
PIF_VALUE: 21
PIF_VALUE: 20
PIF_VALUE: 37
PIF_VALUE: 37
PIF_VALUE: 36
PIF_VALUE: 35
PIF_VALUE: 37
PIF_VALUE: 36
PIF_VALUE: 37
PIF_VALUE: 21
PIF_VALUE: 21
PIF_VALUE: 36
PIF_VALUE: 35
PIF_VALUE: 35
PIF_VALUE: 36
PIF_VALUE: 22
PIF_VALUE: 36
PIF_VALUE: 36

## 2020-05-19 ASSESSMENT — PAIN SCALES - GENERAL
PAINLEVEL_OUTOF10: 1
PAINLEVEL_OUTOF10: 7
PAINLEVEL_OUTOF10: 5
PAINLEVEL_OUTOF10: 7
PAINLEVEL_OUTOF10: 8
PAINLEVEL_OUTOF10: 5

## 2020-05-19 ASSESSMENT — PAIN DESCRIPTION - PAIN TYPE: TYPE: SURGICAL PAIN

## 2020-05-19 ASSESSMENT — PAIN DESCRIPTION - LOCATION: LOCATION: ABDOMEN

## 2020-05-19 ASSESSMENT — PAIN - FUNCTIONAL ASSESSMENT: PAIN_FUNCTIONAL_ASSESSMENT: 0-10

## 2020-05-19 NOTE — OP NOTE
FACILITY:  [address] [place] [address] Kat Whittington  1960  [id number]    DATE: 5/19/2020  SURGEON: : Dr. Michael Pereyra M.D, MD  PREOPERATIVE DIAGNOSIS:  Prostate cancer. POSTOPERATIVE DIAGNOSIS: Prostate cancer. OPERATION:  1. Robotic radical prostatectomy with bilateral pelvic lymph node dissection. ANESTHESIA:  General.   COMPLICATIONS:  None. ESTIMATED BLOOD LOSS:  Minimal.  FLUIDS:  Crystalloid. DRAINS:  Urethral seo catheter. SPECIMENS:  None. INDICATIONS FOR THE PROCEDURE:  Karen Escalante is a 64 y.o. male with a history of benjamin 9 prostate cancer. After treatment options were discussed including risks, benefits, alternatives, goals and possible complications,  the patient elected to proceed with today's procedure. PSA:    Lab Results   Component Value Date    PSA 11.85 02/12/2020      Pathology: positive urethral frozen margin, second margin negative  Notes: apical disease possibly gross present      DESCRIPTION OF PROCEDURE:  This patient was given preoperative anticoagulation and antibiotics and was brought back to the operating room and positioned in the supine position. General anesthesia was started. He was secured to the bed and then he was sterilely prepped and draped in standard fashion. An 25 Japanese Seo catheter was placed and a supraumbilical skin incision was made, a Veress needle was placed through this into the peritoneum. Pneumoperitoneum was obtained. An 8 mm port was placed through this incision into the peritoneum. The peritoneum was examined. No injuries were noted. The rest of the ports were placed in the usual fashion. Three robotic 8 mm ports were placed into assistant, one 12 mm, one 5 mm ports were placed under direct vision. After this the robot was then docked and the procedure was started by reflecting the bladder in the usual fashion by incising lateral to the medial umbilical ligaments and transecting the urachus.  The fat off the anterior aspect of the prostate was excised. Endopelvic fascia on each side was incised. The dorsal vein was ligated with a figure-of-8 stitch of 0-V-Loc. This was pexed to the pubic bone. I then transected the bladder making sure not to enter the prostate and making sure not to enter the ureteral orifices. The anterior Denonvilliers fascia was identified, it was incised. The seminal vesicles and vasa were dissected and lifted anteriorly. The posterior Denonvilliers fascia was incised and the rectum was swept off the posterior lateral aspect of the prostate. The prostatic  pedicles were ligated and divided with Hem-o-Sabas clips and we performed wide nerve dissection. This progressed on both sides towards the apex of the prostate, then the dorsal venous complex was incised, the urethra was dissected and then transected maintaining urethral length. The rectourethralis was transected. There did appear to be apical thickness to the rectourethralis and the periurethral tissue that was suspicious for gross cancer. I did take specimen from the distal urethral stump and it was positive for cancer. I then resected another small piece from the urethral stump and the frozen margin status was negative for cancer. After careful and thorough dissection the prostate was free and the pelvis was irrigated, no injuries were noted. There was adequate hemostasis. The right total pelvic lymph node dissection was performed by removing lymph nodes between the iliac vein and the obturator nerve, making sure not to injure the structures, the same was on the left side by removing the nodes between the iliac vein and the obturator nerve, making sure not to injure the structures. After this the lymph nodes and the prostate were placed into an EndoCatch bag and then the Dave stitch was performed with a 3-0 Monocryl and a figure-of-8 stitch manner, bringing together posterior Denonvilliers fascia and the rectourethralis was tied down.  The

## 2020-05-19 NOTE — CARE COORDINATION
Case Management Initial Discharge Plan  Bautista Paris,             Met with:patient to discuss discharge plans. Information verified: address, contacts, phone number, , insurance Yes  Emergency Contact/Next of Kin name & number:   PCP: Marce Munguia MD  Date of last visit:     Insurance Provider: [facility]     Discharge Planning    Living Arrangements:  Spouse/Significant Other   Support Systems:  Spouse/Significant Other    Home has  stories   stairs to climb to get into front door, stairs to climb to reach second floor  Location of bedroom/bathroom in home     Patient able to perform ADL's:Independent    Current Services (outpatient & in home) none  DME equipment: none  DME provider: na      Potential Assistance Needed:  N/A    Patient agreeable to home care: No  [place] of choice provided:  n/a    Prior SNF/Rehab Placement and Facility: none  Agreeable to SNF/Rehab: No  [place] of choice provided: n/a   Evaluation: n/a    Expected Discharge date:     Patient expects to be discharged to:  home  Follow Up Appointment: Best Day/ Time:      Transportation provider: none  Transportation arrangements needed for discharge: No    Readmission Risk              Risk of Unplanned Readmission:        0             Does patient have a readmission risk score greater than 14?: No  If yes, follow-up appointment must be made within 7 days of discharge. Goals of Care:       Discharge Plan: DC to home independently; has established care.  Wife to TP          Electronically signed by Remy Kilgore RN on 20 at 4:56 PM EDT

## 2020-05-19 NOTE — ANESTHESIA POSTPROCEDURE EVALUATION
Department of Anesthesiology  Postprocedure Note    Patient: Silvana Desai  MRN: [id number]  YOB: 1960  Date of evaluation: 5/19/2020  Time:  4:10 PM     Procedure Summary     Date:  05/19/20 Room / Location:  [facility] / [address]    Anesthesia Start:  7655 Anesthesia Stop:  1130    Procedure:  XI ROBOTIC LAPAROSCOPIC PROSTATECTOMY, PELVIC LYMPHNODE DISSECTION (N/A ) Diagnosis:  (PROSTATE CANCER)    Surgeon:  Tejal Parra MD Responsible Provider:  Sheryle Ou, MD    Anesthesia Type:  general ASA Status:  3          Anesthesia Type: general    Tin Phase I: Tin Score: 9    Tin Phase II:      Last vitals: Reviewed and per EMR flowsheets.      POST-OP ANESTHESIA NOTE       /87   Pulse 80   Temp 97.2 °F (36.2 °C) (Oral)   Resp 16   Ht 5' 5\" (1.651 m)   Wt 183 lb 8 oz (83.2 kg)   SpO2 98%   BMI 30.54 kg/m²    Pain Assessment: 0-10  Pain Level: 5       Anesthesia Post Evaluation    Patient location during evaluation: PACU  Patient participation: complete - patient participated  Level of consciousness: awake  Pain score: 5  Airway patency: patent  Nausea & Vomiting: no vomiting and no nausea  Complications: no  Cardiovascular status: hemodynamically stable  Respiratory status: acceptable  Hydration status: stable

## 2020-05-20 VITALS
RESPIRATION RATE: 16 BRPM | WEIGHT: 183.5 LBS | HEART RATE: 71 BPM | BODY MASS INDEX: 30.57 KG/M2 | TEMPERATURE: 98.1 F | HEIGHT: 65 IN | DIASTOLIC BLOOD PRESSURE: 63 MMHG | OXYGEN SATURATION: 95 % | SYSTOLIC BLOOD PRESSURE: 118 MMHG

## 2020-05-20 LAB
ANION GAP SERPL CALCULATED.3IONS-SCNC: 13 MMOL/L (ref 9–17)
BUN BLDV-MCNC: 13 MG/DL (ref 6–20)
BUN/CREAT BLD: ABNORMAL (ref 9–20)
CALCIUM SERPL-MCNC: 7.9 MG/DL (ref 8.6–10.4)
CHLORIDE BLD-SCNC: 102 MMOL/L (ref 98–107)
CO2: 22 MMOL/L (ref 20–31)
CREAT SERPL-MCNC: 0.77 MG/DL (ref 0.7–1.2)
CULTURE: NO GROWTH
GFR AFRICAN AMERICAN: >60 ML/MIN
GFR NON-AFRICAN AMERICAN: >60 ML/MIN
GFR SERPL CREATININE-BSD FRML MDRD: ABNORMAL ML/MIN/{1.73_M2}
GFR SERPL CREATININE-BSD FRML MDRD: ABNORMAL ML/MIN/{1.73_M2}
GLUCOSE BLD-MCNC: 127 MG/DL (ref 70–99)
HCT VFR BLD CALC: 39.2 % (ref 40.7–50.3)
HEMOGLOBIN: 13 G/DL (ref 13–17)
Lab: NORMAL
MCH RBC QN AUTO: 31.4 PG (ref 25.2–33.5)
MCHC RBC AUTO-ENTMCNC: 33.2 G/DL (ref 28.4–34.8)
MCV RBC AUTO: 94.7 FL (ref 82.6–102.9)
NRBC AUTOMATED: 0 PER 100 WBC
PDW BLD-RTO: 12.9 % (ref 11.8–14.4)
PLATELET # BLD: 239 K/UL (ref 138–453)
PMV BLD AUTO: 9.4 FL (ref 8.1–13.5)
POTASSIUM SERPL-SCNC: 4.5 MMOL/L (ref 3.7–5.3)
RBC # BLD: 4.14 M/UL (ref 4.21–5.77)
SODIUM BLD-SCNC: 137 MMOL/L (ref 135–144)
SPECIMEN DESCRIPTION: NORMAL
SURGICAL PATHOLOGY REPORT: NORMAL
WBC # BLD: 11.1 K/UL (ref 3.5–11.3)

## 2020-05-20 PROCEDURE — 96374 THER/PROPH/DIAG INJ IV PUSH: CPT

## 2020-05-20 PROCEDURE — 2580000003 HC RX 258: Performed by: STUDENT IN AN ORGANIZED HEALTH CARE EDUCATION/TRAINING PROGRAM

## 2020-05-20 PROCEDURE — 85027 COMPLETE CBC AUTOMATED: CPT

## 2020-05-20 PROCEDURE — 6360000002 HC RX W HCPCS: Performed by: STUDENT IN AN ORGANIZED HEALTH CARE EDUCATION/TRAINING PROGRAM

## 2020-05-20 PROCEDURE — G0378 HOSPITAL OBSERVATION PER HR: HCPCS

## 2020-05-20 PROCEDURE — 36415 COLL VENOUS BLD VENIPUNCTURE: CPT

## 2020-05-20 PROCEDURE — 6370000000 HC RX 637 (ALT 250 FOR IP): Performed by: STUDENT IN AN ORGANIZED HEALTH CARE EDUCATION/TRAINING PROGRAM

## 2020-05-20 PROCEDURE — 80048 BASIC METABOLIC PNL TOTAL CA: CPT

## 2020-05-20 RX ORDER — POLYETHYLENE GLYCOL 3350 17 G/17G
17 POWDER, FOR SOLUTION ORAL DAILY PRN
Qty: 255 G | Refills: 0 | Status: SHIPPED | OUTPATIENT
Start: 2020-05-20 | End: 2020-06-04

## 2020-05-20 RX ORDER — OXYCODONE HYDROCHLORIDE AND ACETAMINOPHEN 5; 325 MG/1; MG/1
1 TABLET ORAL EVERY 6 HOURS PRN
Qty: 20 TABLET | Refills: 0 | Status: SHIPPED | OUTPATIENT
Start: 2020-05-20 | End: 2020-05-25

## 2020-05-20 RX ORDER — CIPROFLOXACIN 500 MG/1
500 TABLET, FILM COATED ORAL 2 TIMES DAILY
Qty: 6 TABLET | Refills: 0 | Status: SHIPPED | OUTPATIENT
Start: 2020-05-20 | End: 2020-05-23

## 2020-05-20 RX ADMIN — OXYCODONE HYDROCHLORIDE AND ACETAMINOPHEN 2 TABLET: 5; 325 TABLET ORAL at 13:27

## 2020-05-20 RX ADMIN — DEXTROSE MONOHYDRATE 2 G: 50 INJECTION, SOLUTION INTRAVENOUS at 00:11

## 2020-05-20 RX ADMIN — POLYETHYLENE GLYCOL 3350 17 G: 17 POWDER, FOR SOLUTION ORAL at 08:50

## 2020-05-20 RX ADMIN — SODIUM CHLORIDE, PRESERVATIVE FREE 10 ML: 5 INJECTION INTRAVENOUS at 00:16

## 2020-05-20 RX ADMIN — METOPROLOL TARTRATE 12.5 MG: 25 TABLET ORAL at 08:43

## 2020-05-20 RX ADMIN — MORPHINE SULFATE 2 MG: 2 INJECTION, SOLUTION INTRAMUSCULAR; INTRAVENOUS at 01:22

## 2020-05-20 RX ADMIN — OXYCODONE HYDROCHLORIDE AND ACETAMINOPHEN 1 TABLET: 5; 325 TABLET ORAL at 07:45

## 2020-05-20 RX ADMIN — SODIUM CHLORIDE, PRESERVATIVE FREE 10 ML: 5 INJECTION INTRAVENOUS at 08:44

## 2020-05-20 RX ADMIN — OXYCODONE HYDROCHLORIDE AND ACETAMINOPHEN 1 TABLET: 5; 325 TABLET ORAL at 00:11

## 2020-05-20 RX ADMIN — OXYCODONE HYDROCHLORIDE AND ACETAMINOPHEN 1 TABLET: 5; 325 TABLET ORAL at 06:41

## 2020-05-20 ASSESSMENT — PAIN SCALES - GENERAL
PAINLEVEL_OUTOF10: 6
PAINLEVEL_OUTOF10: 6
PAINLEVEL_OUTOF10: 7
PAINLEVEL_OUTOF10: 6
PAINLEVEL_OUTOF10: 5
PAINLEVEL_OUTOF10: 5
PAINLEVEL_OUTOF10: 6
PAINLEVEL_OUTOF10: 3

## 2020-05-20 NOTE — PROGRESS NOTES
Enrique Turpin MD, Delroy Montiel MD, Debrah Bamberger, MD, Marc Wilhelm MD, Thomas Vasquez MD, Elizabeth Zavala MD, & Kurtis Leiva MD    Urology - Progress Note      Subjective:   No acute events overnight. Denies F/C/N/V/CP/SOA. Bowel function: no flatus, no BMs  Diet: Clear liquids, tolerating  Pain: controlled  +Ambulation in hallway    Patient Vitals for the past 24 hrs:   BP Temp Temp src Pulse Resp SpO2   05/20/20 0400 105/63 97.8 °F (36.6 °C) Oral 70 14 95 %   05/20/20 0000 120/79 98.5 °F (36.9 °C) Oral 84 14 93 %   05/19/20 2015 123/71 99.5 °F (37.5 °C) Oral 100 16 93 %   05/19/20 1505 138/87 97.2 °F (36.2 °C) Oral 80 16 98 %   05/19/20 1430 130/81 -- -- 68 17 97 %   05/19/20 1400 125/75 97 °F (36.1 °C) Tympanic 64 18 100 %   05/19/20 1200 120/68 96.8 °F (36 °C) Temporal 60 14 95 %   05/19/20 1147 132/81 -- -- 57 22 99 %   05/19/20 1134 126/77 -- -- 61 20 97 %   05/19/20 1123 123/81 96.8 °F (36 °C) Temporal 65 18 100 %       Intake/Output Summary (Last 24 hours) at 5/20/2020 0737  Last data filed at 5/20/2020 0650  Gross per 24 hour   Intake --   Output 2575 ml   Net -2575 ml       Recent Labs     05/19/20  1154 05/20/20  0609   WBC 11.5* 11.1   HGB 14.6 13.0   HCT 43.9 39.2*   MCV 92.4 94.7    239     Recent Labs     05/19/20  1154 05/20/20  0609   * 137   K 4.4 4.5    102   CO2 19* 22   BUN 18 13   CREATININE 0.78 0.77       No results for input(s): COLORU, PHUR, LABCAST, WBCUA, RBCUA, MUCUS, TRICHOMONAS, YEAST, BACTERIA, CLARITYU, SPECGRAV, LEUKOCYTESUR, UROBILINOGEN, BILIRUBINUR, BLOODU in the last 72 hours. Invalid input(s): NITRATE, GLUCOSEUKETONESUAMORPHOUS    Additional Lab/culture results: None    Physical Exam:   NAD, AOx3  NLB, equal chest rise B/L  RRR, 2+ radial pulses  ABD S/ND/appropriate postop TTP, incisions C/D/I  Cid draining clear yellow urine  Warm extremities, no calf tenderness    Interval Imaging Findings: None     Assessment:  Jaspal Chaudhari is a 64 y.o.

## 2020-05-20 NOTE — FLOWSHEET NOTE
05/20/20 1150   Encounter Summary   Services provided to: Patient   Support System Family members   Continue Visiting   (05/20/2020)   Complexity of Encounter Moderate   Length of Encounter 15 minutes   Spiritual Assessment Completed Yes   Routine   Type Initial   Assessment Approachable;Calm; Hopeful   Intervention Active listening;Nurtured hope;Prayer;Empowerment   Outcome Expressed gratitude   Spiritual/Anabaptism   Type Spiritual support

## 2020-07-08 ENCOUNTER — HOSPITAL ENCOUNTER (OUTPATIENT)
Age: 60
Setting detail: SPECIMEN
Discharge: HOME | End: 2020-07-08
Payer: COMMERCIAL

## 2020-07-08 LAB — PROSTATE SPECIFIC ANTIGEN: 0.03 UG/L

## 2020-07-13 ENCOUNTER — TELEPHONE (OUTPATIENT)
Dept: UROLOGY | Age: 60
End: 2020-07-13

## 2020-07-13 NOTE — TELEPHONE ENCOUNTER
Spoke with patient to see if he was following up with Dr. Yelitza Vidal from Prostatectomy done on 5/19/2020. Patient stated that he has a follow up appointment at [place] [id number] with Dr. Yelitza Vidal on 7/15/2020 and would like to follow up as a patient there from now on.

## 2020-07-20 ENCOUNTER — TELEPHONE (OUTPATIENT)
Dept: RADIATION ONCOLOGY | Facility: MEDICAL CENTER | Age: 60
End: 2020-07-20

## 2020-07-22 ENCOUNTER — HOSPITAL ENCOUNTER (OUTPATIENT)
Dept: RADIATION ONCOLOGY | Facility: MEDICAL CENTER | Age: 60
Discharge: HOME | End: 2020-07-22
Payer: COMMERCIAL

## 2020-07-22 VITALS
WEIGHT: 182.05 LBS | BODY MASS INDEX: 30.33 KG/M2 | TEMPERATURE: 97.7 F | HEART RATE: 66 BPM | DIASTOLIC BLOOD PRESSURE: 78 MMHG | OXYGEN SATURATION: 98 % | SYSTOLIC BLOOD PRESSURE: 128 MMHG | HEIGHT: 65 IN | RESPIRATION RATE: 20 BRPM

## 2020-07-22 PROCEDURE — 77263 THER RADIOLOGY TX PLNG CPLX: CPT | Performed by: RADIOLOGY

## 2020-07-22 PROCEDURE — 99213 OFFICE O/P EST LOW 20 MIN: CPT | Performed by: RADIOLOGY

## 2020-07-22 PROCEDURE — 99202 OFFICE O/P NEW SF 15 MIN: CPT | Performed by: RADIOLOGY

## 2020-07-22 ASSESSMENT — PAIN SCALES - GENERAL: PAINLEVEL_OUTOF10: 3

## 2020-07-22 ASSESSMENT — PAIN DESCRIPTION - ONSET: ONSET: ON-GOING

## 2020-07-22 ASSESSMENT — PAIN DESCRIPTION - PAIN TYPE: TYPE: SURGICAL PAIN

## 2020-07-22 ASSESSMENT — PAIN DESCRIPTION - LOCATION: LOCATION: BUTTOCKS

## 2020-07-22 NOTE — PROGRESS NOTES
Referring Physician: Frandy Shipman    Pt here for consult. Pictures, history and meds updated. Elevated PSA with life insurance screening  labs. Patient has prostatectomy May 19th 2020 at [facility] and some incontinence post surgery. Dr. Trish Leung to [facility].      Vitals:    20 0818   BP: 128/78   Pulse: 66   Resp: 20   Temp: 97.7 °F (36.5 °C)   SpO2: 98%    :  Patient Currently in Pain: Yes  Pain Assessment: 0-10  Pain Level: 3(when sitting)       Wt Readings from Last 1 Encounters:   20 182 lb 0.8 oz (82.6 kg)        Body mass index is 30.29 kg/m². Height: 5' 5\" (165.1 cm)         Immunizations:    Influenza status:    [x]   Current   []   Patient declined    Pneumococcal status:  []   Current  [x]   Patient declined    Smoking Status:    [] Smoker - PPD:   [] Nonsmoker - Quit Date:               [x] Never a smoker      No chief complaint on file. Cancer Staging  Prostate cancer [facility])  Staging form: Prostate, AJCC 8th Edition  - Clinical stage from 2020: Stage IIIC (cT3b, cN0, cM0, PSA: 11.9, Grade Group: 5) - Signed by Sheri Vaughan MD on 2020      Prior Radiation Therapy? No   If yes, site treated:   Facility:                             Date:    Concurrent Chemo/radiation? No   If yes, start date:    Prior Chemotherapy? No   If yes    Facility:                             Date:    Prior Hormonal Therapy?  none   If yes   Facility:                             Date:    Head and Neck Cancer? No   If yes, please remind physician to place swallow study order and speech therapy order.       Pacemaker/Defibulator/ICD:  No             BREAST Patient only:     LMP:    Age at first Menses:    Para:    :    Cup size:    Lymphedema Evaluation[de-identified]   [] left arm      [] right arm  Location:     Measurement (cm)    Upper Bicep :    Lower Bicep :                   Current Outpatient Medications   Medication Sig Dispense Refill    metoprolol tartrate (LOPRESSOR) 25 MG tablet Take 0.5 tablets by mouth 2 times daily (Patient taking differently: Take 12.5 mg by mouth daily ) 90 tablet 3    clonazePAM (KLONOPIN) 1 MG tablet Take 1 mg by mouth nightly. No current facility-administered medications for this encounter. Past Medical History:   Diagnosis Date    Anxiety state, unspecified     Central sleep apnea     uses SV machine, Dr. [facility]    Cholelithiasis     History of bronchitis 2017    History of rectal bleeding 2014    Hypoglycemia     Other and unspecified angina pectoris     2009-stress related    Prostate cancer ([facility] [address] 2020    Unspecified essential hypertension     Vision abnormalities        Past Surgical History:   Procedure Laterality Date    CHOLECYSTECTOMY, LAPAROSCOPIC  10/2016    COLONOSCOPY  12/4/14    NORMAL-RANDOM BIOPSY    LIPOMA RESECTION Left APPROX.  2010    LEFT ELBOW    PROSTATE BIOPSY  03/17/2020    PROSTATECTOMY  05/19/2020    ROBOTIC LAPAROSCOPIC PROSTATECTOMY, PELVIC LYMPHNODE DISSECTION     PROSTATECTOMY N/A 5/19/2020    XI ROBOTIC LAPAROSCOPIC PROSTATECTOMY, PELVIC LYMPHNODE DISSECTION performed by Blanca Maxwell MD at [place] History   Problem Relation Age of Onset    Heart Failure Father     Cancer Mother         outside the colon, and breast    Diabetes Sister     Cancer Maternal Grandmother         kidney    Cancer Maternal Grandfather         bladder       Social History     Socioeconomic History    Marital status:      Spouse name: Not on file    Number of children: Not on file    Years of education: Not on file    Highest education level: Not on file   Occupational History    Not on file   Social Needs    Financial resource strain: Not on file    Food insecurity     Worry: Not on file     Inability: Not on file    Transportation needs     Medical: Not on file     Non-medical: Not on file   Tobacco Use    Smoking status: Never Smoker    Smokeless tobacco: Never Used   Substance and Sexual Activity    Alcohol use: Yes     Comment: social    Drug use: No    Sexual activity: Not on file   Lifestyle    Physical activity     Days per week: Not on file     Minutes per session: Not on file    Stress: Not on file   Relationships    Social connections     Talks on phone: Not on file     Gets together: Not on file     Attends Mosque service: Not on file     Active member of club or organization: Not on file     Attends meetings of clubs or organizations: Not on file     Relationship status: Not on file    Intimate partner violence     Fear of current or ex partner: Not on file     Emotionally abused: Not on file     Physically abused: Not on file     Forced sexual activity: Not on file   Other Topics Concern    Not on file   Social History Narrative    Not on file             FALLS RISK SCREEN  Instructions:  Assess the patient and enter the appropriate indicators that are present for fall risk identification. Total the numbers entered and assign a fall risk score from Table 2.  Reassess patient at a minimum every 12 weeks or with status change. Assessment   Date  7/22/2020     1. Mental Ability: confusion/cognitively impaired 0     2. Elimination Issues: incontinence, frequency 0       3. Ambulatory: use of assistive devices (walker, cane, off-loading devices),        attached to equipment (IV pole, oxygen) 0     4. Sensory Limitations: dizziness, vertigo, impaired vision 0     5. Age less than 65        0     6. Age 72 or greater 0     7. Medication: diuretics, strong analgesics, hypnotics, sedatives,        antihypertensive agents 0   8. Falls:  recent history of falls within the last 3 months (not to include slipping or        tripping) 0   TOTAL 0    If score of 4 or greater was education given? No       TABLE 2   Risk Score Risk Level Plan of Care   0-3 Little or  No Risk 1. Provide assistance as indicated for ambulation activities  2. Reorient confused/cognitively impaired patient  3.

## 2020-07-22 NOTE — PROGRESS NOTES
Azithromycin  10/03/2014    Nausea Only, Other (See Comments)    headache    Seasonal  10/29/2019        Sinus drainage    Problem List            Noted    Acute bronchitis due to Haemophilus influenzae  2/14/2017    Anxiety state  Unknown    Benign prostatic hyperplasia without lower urinary tract symptoms  2/12/2020    Calculus of gallbladder  8/1/2016    Calculus of gallbladder without cholecystitis without obstruction  8/1/2016    Diarrhea, functional  1/25/2016    Esophageal reflux  Unknown    Essential hypertension (Chronic)  Unknown    Other and unspecified angina pectoris  Unknown    Other and unspecified hyperlipidemia (Chronic)  Unknown    Prostate cancer ([facility] [address]  5/19/2020    Rectal bleeding  10/13/2014    S/P prostatectomy  5/19/2020    Sleep apnea (Chronic)  Unknown    Encounter Diagnosis        Codes  Comments    Acute post-operative pain  - Primary  G89.18               Current Medications        Sig    metoprolol tartrate (LOPRESSOR) 25 MG tablet  Take 0.5 tablets by mouth 2 times daily    clonazePAM (KLONOPIN) 1 MG tablet  Take 1 mg by mouth nightly. Vital Signs   Most recent update: 5/20/2020 11:28 AM   BP    118/63     Ht    5' 5\" (1.651 m)     Wt    183 lb 8 oz (83.2 kg)     BMI    30.54 kg/m²              Vitals History    Social History      Tobacco History      Smoking Status   Never Smoker    Smokeless Tobacco Use   Never Used    Functional and Cognitive Status        Most Recent Value    Are you deaf or do you have serious difficulty hearing? No    Are you blind or do you have serious difficulty seeing, even when wearing glasses? No    Do you have serious difficulty walking or climbing stairs? (11years old or older)  No    Do you have difficulty dressing, grooming, bathing, feeding, toileting, and/or  In/Out Bed? (11years old or older)  No    Because of a physical, mental, or emotional condition, do you have difficulty doing errands alone such as visiting a doctors office or shopping? (17 years old or older)  No    Because of a physical, mental, or emotional condition, do you have serious difficulty concentrating, remembering, or making decisions? (11years old or older)  No    Immunizations      Name  Date    Influenza Virus Vaccine  10/22/2019    Lab, Imaging, and Immunization Orders      Active      Surgical Pathology  Ordered On: 05/19/2020    Surgical Pathology  Ordered On: 05/19/2020    Surgical Pathology  Ordered On: 05/19/2020    Surgical Pathology  Ordered On: 05/19/2020    Completed      POC Glucose Fingerstick  Ordered On: 05/19/2020    Culture, Urine  Ordered On: 05/19/2020    CBC without Diff  Ordered On: 09/55/1844    Basic Metabolic Panel  Ordered On: 05/19/2020    Surgical Pathology  Ordered On: 65/86/9004    Basic Metabolic Panel  Ordered On: 05/19/2020    CBC  Ordered On: 05/19/2020    PATHOLOGY REPORT  Ordered On: 05/21/2020    Results      Procedure  Component  Value  Units  Date/Time    Basic Metabolic Panel [[id number]] (Abnormal)  Collected: 05/20/20 0609    Specimen: Blood  Updated: 05/20/20 0657      Glucose  127High    mg/dL        BUN  13  mg/dL        CREATININE  0.77  mg/dL        Bun/Cre Ratio  NOT REPORTED      Calcium  7.9Low    mg/dL        Sodium  137  mmol/L        Potassium  4.5  mmol/L        Chloride  102  mmol/L        CO2  22  mmol/L        Anion Gap  13  mmol/L        GFR Non-African American  >60  mL/min        GFR African American  >60  mL/min        GFR Comment            Comment:  Average GFR for 52-63 years old:    80 mL/min/1.73sq m   Chronic Kidney Disease:    <60 mL/min/1.73sq m   Kidney failure:    <15 mL/min/1.73sq m               eGFR calculated using average adult body mass.  Additional eGFR calculator available at:         [email or web address]               GFR Staging  NOT REPORTED    Basic Metabolic Panel [[id number]] (Abnormal)  Collected: 05/19/20 1154    Specimen: Blood  Updated: 05/19/20 1226      Glucose  159High mg/dL        BUN  18  mg/dL        CREATININE  0.78  mg/dL        Bun/Cre Ratio  NOT REPORTED      Calcium  8.1Low    mg/dL        Sodium  134Low    mmol/L        Potassium  4.4  mmol/L        Chloride  103  mmol/L        CO2  19Low    mmol/L        Anion Gap  12  mmol/L        GFR Non-African American  >60  mL/min        GFR African American  >60  mL/min        GFR Comment            Comment:  Average GFR for 52-63 years old:    80 mL/min/1.73sq m   Chronic Kidney Disease:    <60 mL/min/1.73sq m   Kidney failure:    <15 mL/min/1.73sq m               eGFR calculated using average adult body mass. Additional eGFR calculator available at:         [email or web address]               GFR Staging  NOT REPORTED    CBC [[id number]] (Abnormal)  Collected: 05/20/20 0609    Specimen: Blood  Updated: 05/20/20 0631      WBC  11.1  k/uL        RBC  4. 14Low    m/uL        Hemoglobin  13.0  g/dL        Hematocrit  39.2Low    %        MCV  94.7  fL        MCH  31.4  pg        MCHC  33.2  g/dL        RDW  12.9  %        Platelets  836  k/uL        MPV  9.4  fL        NRBC Automated  0.0  per 100 WBC      CBC without Diff [[id number]] (Abnormal)  Collected: 05/19/20 1154    Specimen: Blood  Updated: 05/19/20 1206      WBC  11. 5High    k/uL        RBC  4.75  m/uL        Hemoglobin  14.6  g/dL        Hematocrit  43.9  %        MCV  92.4  fL        MCH  30.7  pg        MCHC  33.3  g/dL        RDW  12.6  %        Platelets  224  k/uL        MPV  9.4  fL        NRBC Automated  0.0  per 100 WBC      Culture, Urine [[id number]]  Collected: 05/19/20 0913    Specimen: Urine, straight catheter  Updated: 05/20/20 5094      Specimen Description  . URINE,STRAIGHT CATHETER      Special Requests  NOT REPORTED      Culture  NO GROWTH    PATHOLOGY REPORT [[id number]]  Resulted: 05/21/20 1241      Updated: 05/21/20 1241    POC Glucose Fingerstick [[id number]] (Abnormal)  Collected: 05/19/20 1104      Updated: 05/19/20 1110      POC Glucose  143High    mg/dL      Surgical Pathology [[id number]]  Collected: 05/19/20 1018      Updated: 05/20/20 1618      Surgical Pathology Report  --      -- Diagnosis --   1.  URETHRAL MARGIN BIOPSY:  POSITIVE FOR ADENOCARCINOMA. 2.  URETHRAL MARGIN #2 BIOPSY:  POSITIVE FOR ADENOCARCINOMA;   NON-CAUTERIZED SIDE (TRUE MARGIN) NEGATIVE. 3.  PROSTATE GLAND, RADICAL PROSTATECTOMY:  ADENOCARCINOMA, MAGDI   GRADE 4+5 = SCORE 9.  POSITIVE FOR RIGHT AND LEFT POSTERIOR   EXTRAPROSTATIC EXTENSION, RIGHT SEMINAL VESICLE INVASION AND BLADDER   NECK INVASION.  POSITIVE APICAL, LEFT POSTERIOR AND LEFT BLADDER NECK   MARGINS. 4.  BILATERAL PELVIC LYMPH NODE DISSECTION:  NEGATIVE FOR MALIGNANCY   (0/4). PORSCHE [place]   **Electronically Signed Out**         ajb/5/20/2020         Clinical Information   Pre-op Diagnosis:  PROSTATE CANCER   Operative Findings:  URETHRAL MARGIN; URETHRAL MARGIN; URETHRAL MARGIN   (CHECK UN-CHARRED SIDE); PROSTATE WITH SEMINAL VESICLES; PELVIC LYMPH   NODES BILATERAL   Operation Performed:  XI ROBOTIC LAPAROSCOPIC PROSTATECTOMY, PELVIC   LYMPH NODE DISSECTION     Source of Specimen   1: URETHRAL MARGIN   2: URETHRAL MARGIN   3: PROSTATE WITH SEMINAL VESICLES   4: PELVIC LYMPH NODES - BILATERAL     Gross Description   1.  \"CIRO YOUNG, URETHRAL MARGIN\" Received fresh is a 0.5 x 0.3 x   0.2 cm pink fragment.  1FS, 1cs. 2.  \"CIRO YOUNG URETHRAL MARGIN  CHECK UN-CHARRED SIDE\" Received   fresh is a 0.5 x 0.3 x 0.2 cm pink fragment.  The cauterized edge is   inked black and opposite edge blue.  1FS, 1cs.    3.  \"CIRO YOUNG, PROSTATE WITH SEMINAL VESICLES\" 42 gram, 3.7 x   4.4 x 3.3 cm (L x W x H) prostate with attached focally disrupted   seminal vesicles and vasa deferentia.  Separate is a 3.5 x 3.0 x 1.0   cm portion of fat.  The external surface of the prostate is ragged   pink-tan with the right half inked blue, left half black and anterior   aspect tagged green.  Sectioning reveals tan induration on the   posterior aspect that extends from the apex to the mid portion of the   gland.  The seminal vesicles and vasa deferentia are grossly   unremarkable.  Cassette summary:  \"A-C\" apex margin perpendicular,   \"D-F\" cross section, \"G-I\" rim section, \"J-M\" cross section, \"N\"   seminal vesicles region of base, \"O-P\" bladder neck margin   perpendicular, \"Q\" seminal vesicles and vasa deferentia margins, \"R\"   representative separate fat. 4.  \"CIRO YOUNG, PELVIC LYMPH NODES BILATERAL\" Fragments of fat,   4.5 x 4.0 x 1.5 cm in aggregate.  Sectioning reveals disrupted fatty   nodes that measure up to 4.2 cm.  Cassette summary:  \"A\" two nodes,   \"B\" one node, \"C-D\" one node.  tm     Intraoperative Diagnosis   1.  FSDX:  Urethral Margin:  Positive for prostate carcinoma.     2.  FSDX:  Urethral Margin:  Adenocarcinoma present at \"charred\" side   only; \"un-charred\" side negative for tumor.   (AJB)     Microscopic Description     PROCEDURE:     Robotic laparoscopic radical prostatectomy and   bilateral lymph node dissection       HISTOLOGIC TYPE:     Adenocarcinoma, acinar   HISTOLOGIC GRADE (EFRAIN SCORE):   Efrain grade 4+5 = score 9   GRADE GROUP (ISUP / WHO):  Grade group 5   PERCENT PATTERN 4 IN EFRAIN 7:  N/A   TERTIARY PATTERN 5 (LESS THAN 5%) IN     OVERALL EFRAIN SCORE 7 (IF APPLICABLE):     N/A                   TUMOR QUANTITATION:     Largest focus measured from slide 2.3 cm;   approximately 40% of prostate gland involved by tumor   EXTRAPROSTATIC EXTENSION (pT3a):       Positive for extraprostatic   extension, right and left posterior, largest focus right side at least   1.0 cm in maximum extent           URINARY BLADDER NECK INVASION (pT3a):     Positive for bladder neck   invasion, left side           SEMINAL VESICLE INVASION (MUSCLE WALL) (pT3b):    Positive for right   seminal vesicle muscle wall base invasion     MARGINS (LOCATION IF POSITIVE):   Positive left and anterior aspect   apical margin;  positive left posterior margin, 0.9 cm in extent;   focally positive left bladder neck margin   TREATMENT EFFECT ON CARCINOMA:  No evidence of treatment effect         REGIONAL LYMPH NODES   -NUMBER OF LYMPH NODES EXAMINED:     4   -NUMBER WITH METASTASIS:          0                 DISTANT METASTASIS (pM only if confirmed in this case):  N/A     PATHOLOGIC STAGE CLASSIFICATION:     pT3b  pN0   (add TNM descriptors if applicable)     CAP Prostate 4101 in conjunction with AJCC 8 ed.           SURGICAL PATHOLOGY CONSULTATION         Patient Name: Varinder Sit: [id number]   Path Number: [id number]     [address], [facility]. [place], [address]   [phone]   Fax: [phone]    On examination.     Patient looks well in no pain or distress there is no cervical or supraclavicular node enlargement chest is clear abdominal examination revealed no organomegaly or any palpable masses lower limbs reveal no edema rectal examination revealed no evidence of any prostatic tissue in the prostate.     Impression     Patient has prostate cancer [place] 9 postoperatively developed incontinence to urine and impotence. His PSA postoperatively was 0.05. Margins of resection were positive.     Recommendation.     Patient needs postoperative radiation to the prostate in view of positive margin.           I am planning to treat him to a dose of 4500 cGy in 25 treatments using IMRT my target volume will be the prostate bed with part of the bladder. The left pelvic lymph nodes will not be treated because they were negative. Patient will receive cone-down boost to the deliver 2520 cGy in 19 treatment. The prostate bed will  the receive total of 7020 cGy in 39 treatment.   I should mention that with this dose of radiation the chance of controlling disease in the prostate exceed 90% (local control). Early and delayed side effects were explained to the patient in great details and early side effects would consist of patient might feel little tired might lose couple of pounds a month might have urinary or rectal symptoms. Patient was informed if he develop any acute reactions and those will be treated medically. Late side effects which occurred 6 months to many years again patient was informed that those late complications are not common the chance of its clearance is small. They include very small chance of injury to bladder rectum or small bowel. Patient already have incontinence to urine and impotence. He will be seen by his urologist for erectile dysfunction management. Again would like thank you very much for letting me participate in his care and I will keep you updated on him.

## 2020-07-22 NOTE — PROGRESS NOTES
Show:Clear all  [x]Manual[]Template[x]Copied    Added by:  [x]Magalie Valdez MD    []Hover for details  Reason for consultation   newly diagnosed prostate cancer postoperatively for assessment for radiation treatment.     History of present illness.     Patient's 61-year-old white man was found to have elevated PSA for that underwent ultrasound-guided needle biopsy of his prostate. On pathological examination the tumor was Chester 9 out of 10 as indicated below and the report. Because margin was positive he was referred for radiation treatment.     When I saw the patient basically stated that he has incontinence to urine and he uses pads he feels like he has about 6 or 7 incontinence per day. He also has no ability for erection.     Patient denied any other symptoms.            Demographics      Date Of Birth   1960  Gender Identity   Male  Race   White  Ethnicity   Non-/Non   Preferred Language   English  Preferred Written Language   English    Patient Care Team        Relationship  Specialty  Notifications  Start  End    Jung Chauhan MD  PCP - General      9/30/14      Jung Chauhan MD  PCP - [facility] Provider      6/1/19      This relationship is set externally by [facility] and cannot be changed by end users directly.     Sera Yost MD  Consulting Physician  Gastroenterology    10/13/14      Osiel Marie MD  Consulting Physician  Radiation Oncology    7/17/20      Eleazar Lunsford MD  Consulting Physician  Urology    7/17/20      Follow-up Information      Jung Chauhan MD .      Specialty: Internal Medicine   Contact information:   [facility]    [address] [address]   [phone]    Jung Chauhan MD .      Specialty: Internal Medicine   Contact information:   [address] Bobbi Singer M.D, MD In 1 week.      Specialty: Urology   Contact information:   [address] [facility] [address]   [phone]    Allergies        Noted  Reactions    Azithromycin  10/03/2014    Nausea Only, Other (See Comments)    headache    Seasonal  10/29/2019        Sinus drainage    Problem List            Noted    Acute bronchitis due to Haemophilus influenzae  2/14/2017    Anxiety state  Unknown    Benign prostatic hyperplasia without lower urinary tract symptoms  2/12/2020    Calculus of gallbladder  8/1/2016    Calculus of gallbladder without cholecystitis without obstruction  8/1/2016    Diarrhea, functional  1/25/2016    Esophageal reflux  Unknown    Essential hypertension (Chronic)  Unknown    Other and unspecified angina pectoris  Unknown    Other and unspecified hyperlipidemia (Chronic)  Unknown    [facility]  5/19/2020    Rectal bleeding  10/13/2014    S/P prostatectomy  5/19/2020    Sleep apnea (Chronic)  Unknown    Encounter Diagnosis        Codes  Comments    Acute post-operative pain  - Primary  G89.18               Current Medications        Sig    metoprolol tartrate (LOPRESSOR) 25 MG tablet  Take 0.5 tablets by mouth 2 times daily    clonazePAM (KLONOPIN) 1 MG tablet  Take 1 mg by mouth nightly. Vital Signs   Most recent update: 5/20/2020 11:28 AM   BP    118/63     Ht    5' 5\" (1.651 m)     Wt    183 lb 8 oz (83.2 kg)     BMI    30.54 kg/m²              Vitals History    Social History      Tobacco History      Smoking Status   Never Smoker    Smokeless Tobacco Use   Never Used    Functional and Cognitive Status        Most Recent Value    Are you deaf or do you have serious difficulty hearing? No    Are you blind or do you have serious difficulty seeing, even when wearing glasses? No    Do you have serious difficulty walking or climbing stairs? (11years old or older)  No    Do you have difficulty dressing, grooming, bathing, feeding, toileting, and/or  In/Out Bed?  (11years old or older)  No    Because of a physical, mental, or emotional condition, do you have difficulty doing errands alone such as visiting a doctors office or shopping? (17 years old or older)  No    Because of a physical, mental, or emotional condition, do you have serious difficulty concentrating, remembering, or making decisions? (11years old or older)  No    Immunizations      Name  Date    Influenza Virus Vaccine  10/22/2019    Lab, Imaging, and Immunization Orders      Active      Surgical Pathology  Ordered On: 05/19/2020    Surgical Pathology  Ordered On: 05/19/2020    Surgical Pathology  Ordered On: 05/19/2020    Surgical Pathology  Ordered On: 05/19/2020    Completed      POC Glucose Fingerstick  Ordered On: 05/19/2020    Culture, Urine  Ordered On: 05/19/2020    CBC without Diff  Ordered On: 13/11/0136    Basic Metabolic Panel  Ordered On: 05/19/2020    Surgical Pathology  Ordered On: 87/12/3306    Basic Metabolic Panel  Ordered On: 05/19/2020    CBC  Ordered On: 05/19/2020    PATHOLOGY REPORT  Ordered On: 05/21/2020    Results      Procedure  Component  Value  Units  Date/Time    Basic Metabolic Panel [[id number]] (Abnormal)  Collected: 05/20/20 0609    Specimen: Blood  Updated: 05/20/20 0657      Glucose  127High    mg/dL        BUN  13  mg/dL        CREATININE  0.77  mg/dL        Bun/Cre Ratio  NOT REPORTED      Calcium  7.9Low    mg/dL        Sodium  137  mmol/L        Potassium  4.5  mmol/L        Chloride  102  mmol/L        CO2  22  mmol/L        Anion Gap  13  mmol/L        GFR Non-African American  >60  mL/min        GFR African American  >60  mL/min        GFR Comment            Comment:  Average GFR for 52-63 years old:    80 mL/min/1.73sq m   Chronic Kidney Disease:    <60 mL/min/1.73sq m   Kidney failure:    <15 mL/min/1.73sq m               eGFR calculated using average adult body mass.  Additional eGFR calculator available at:         [employer]eFolder[employer]br               GFR Staging  NOT REPORTED    Basic Metabolic Panel [[id number]] (Abnormal)  Collected: 05/19/20 1154    Specimen: Blood  Updated: 05/19/20 1226      Glucose  159High    mg/dL        BUN  18  mg/dL        CREATININE  0.78  mg/dL        Bun/Cre Ratio  NOT REPORTED      Calcium  8.1Low    mg/dL        Sodium  134Low    mmol/L        Potassium  4.4  mmol/L        Chloride  103  mmol/L        CO2  19Low    mmol/L        Anion Gap  12  mmol/L        GFR Non-African American  >60  mL/min        GFR African American  >60  mL/min        GFR Comment            Comment:  Average GFR for 52-63 years old:    80 mL/min/1.73sq m   Chronic Kidney Disease:    <60 mL/min/1.73sq m   Kidney failure:    <15 mL/min/1.73sq m               eGFR calculated using average adult body mass. Additional eGFR calculator available at:         [employer]br               GFR Staging  NOT REPORTED    CBC [[id number]] (Abnormal)  Collected: 05/20/20 0609    Specimen: Blood  Updated: 05/20/20 0631      WBC  11.1  k/uL        RBC  4. 14Low    m/uL        Hemoglobin  13.0  g/dL        Hematocrit  39.2Low    %        MCV  94.7  fL        MCH  31.4  pg        MCHC  33.2  g/dL        RDW  12.9  %        Platelets  366  k/uL        MPV  9.4  fL        NRBC Automated  0.0  per 100 WBC      CBC without Diff [[id number]] (Abnormal)  Collected: 05/19/20 1154    Specimen: Blood  Updated: 05/19/20 1206      WBC  11. 5High    k/uL        RBC  4.75  m/uL        Hemoglobin  14.6  g/dL        Hematocrit  43.9  %        MCV  92.4  fL        MCH  30.7  pg        MCHC  33.3  g/dL        RDW  12.6  %        Platelets  247  k/uL        MPV  9.4  fL        NRBC Automated  0.0  per 100 WBC      Culture, Urine [[id number]]  Collected: 05/19/20 0913    Specimen: Urine, straight catheter  Updated: 05/20/20 0920      Specimen Description  . URINE,STRAIGHT CATHETER      Special Requests  NOT REPORTED      Culture  NO GROWTH    PATHOLOGY REPORT [[id number]]  Resulted: 05/21/20 1241      Updated: 05/21/20 1241    POC is ragged   pink-tan with the right half inked blue, left half black and anterior   aspect tagged green.  Sectioning reveals tan induration on the   posterior aspect that extends from the apex to the mid portion of the   gland.  The seminal vesicles and vasa deferentia are grossly   unremarkable.  Cassette summary:  \"A-C\" apex margin perpendicular,   \"D-F\" cross section, \"G-I\" rim section, \"J-M\" cross section, \"N\"   seminal vesicles region of base, \"O-P\" bladder neck margin   perpendicular, \"Q\" seminal vesicles and vasa deferentia margins, \"R\"   representative separate fat. 4.  \"CIRO YOUNG, PELVIC LYMPH NODES BILATERAL\" Fragments of fat,   4.5 x 4.0 x 1.5 cm in aggregate.  Sectioning reveals disrupted fatty   nodes that measure up to 4.2 cm.  Cassette summary:  \"A\" two nodes,   \"B\" one node, \"C-D\" one node.  tm     Intraoperative Diagnosis   1.  FSDX:  Urethral Margin:  Positive for prostate carcinoma.     2.  FSDX:  Urethral Margin:  Adenocarcinoma present at \"charred\" side   only; \"un-charred\" side negative for tumor.   (AJB)     Microscopic Description     PROCEDURE:     Robotic laparoscopic radical prostatectomy and   bilateral lymph node dissection       HISTOLOGIC TYPE:     Adenocarcinoma, acinar   HISTOLOGIC GRADE (EFRAIN SCORE):   Efrain grade 4+5 = score 9   GRADE GROUP (ISUP / WHO):  Grade group 5   PERCENT PATTERN 4 IN EFRAIN 7:  N/A   TERTIARY PATTERN 5 (LESS THAN 5%) IN     OVERALL EFRAIN SCORE 7 (IF APPLICABLE):     N/A                   TUMOR QUANTITATION:     Largest focus measured from slide 2.3 cm;   approximately 40% of prostate gland involved by tumor   EXTRAPROSTATIC EXTENSION (pT3a):       Positive for extraprostatic   extension, right and left posterior, largest focus right side at least   1.0 cm in maximum extent           URINARY BLADDER NECK INVASION (pT3a):     Positive for bladder neck   invasion, left side           SEMINAL VESICLE INVASION (MUSCLE WALL) (pT3b):    Positive for right   seminal vesicle muscle wall base invasion     MARGINS (LOCATION IF POSITIVE):   Positive left and anterior aspect   apical margin;  positive left posterior margin, 0.9 cm in extent;   focally positive left bladder neck margin   TREATMENT EFFECT ON CARCINOMA:  No evidence of treatment effect         REGIONAL LYMPH NODES   -NUMBER OF LYMPH NODES EXAMINED:     4   -NUMBER WITH METASTASIS:          0                 DISTANT METASTASIS (pM only if confirmed in this case):  N/A     PATHOLOGIC STAGE CLASSIFICATION:     pT3b  pN0   (add TNM descriptors if applicable)     CAP Prostate 4101 in conjunction with AJCC 8 ed.           SURGICAL PATHOLOGY CONSULTATION         Patient Name: Curt Hester: [id number]   Path Number: [id number]     [address],  [address]. [place], [address]   [phone]   Fax: [phone]    On examination.     Patient looks well in no pain or distress there is no cervical or supraclavicular node enlargement chest is clear abdominal examination revealed no organomegaly or any palpable masses lower limbs reveal no edema rectal examination revealed no evidence of any prostatic tissue in the prostate.     Impression     Patient has prostate cancer Efrain 9 postoperatively developed incontinence to urine and impotence. His PSA postoperatively was 0.05. Margins of resection were positive.     Recommendation.     Patient needs postoperative radiation to the prostate in view of positive margin.           I am planning to treat him to a dose of 4500 cGy in 25 treatments using IMRT my target volume will be the prostate bed with part of the bladder. The left pelvic lymph nodes will not be treated because they were negative. Patient will receive cone-down boost to the deliver 2520 cGy in 19 treatment. The prostate bed will  the receive total of 7020 cGy in 39 treatment.   I should mention that with this dose of radiation the chance of controlling disease in the prostate exceed 90% (local control). Early and delayed side effects were explained to the patient in great details and early side effects would consist of patient might feel little tired might lose couple of pounds a month might have urinary or rectal symptoms. Patient was informed if he develop any acute reactions and those will be treated medically. Late side effects which occurred 6 months to many years again patient was informed that those late complications are not common the chance of its clearance is small. They include very small chance of injury to bladder rectum or small bowel. Patient already have incontinence to urine and impotence. He will be seen by his urologist for erectile dysfunction management. Again would like thank you very much for letting me participate in his care and I will keep you updated on him.

## 2020-07-29 ENCOUNTER — HOSPITAL ENCOUNTER (OUTPATIENT)
Dept: RADIATION ONCOLOGY | Facility: MEDICAL CENTER | Age: 60
Discharge: HOME | End: 2020-07-29
Attending: RADIOLOGY
Payer: COMMERCIAL

## 2020-07-29 PROCEDURE — 77334 RADIATION TREATMENT AID(S): CPT | Performed by: RADIOLOGY

## 2020-07-29 PROCEDURE — 77334 RADIATION TREATMENT AID(S): CPT

## 2020-08-03 ENCOUNTER — HOSPITAL ENCOUNTER (OUTPATIENT)
Dept: RADIATION ONCOLOGY | Facility: MEDICAL CENTER | Age: 60
Discharge: HOME | End: 2020-08-03
Attending: RADIOLOGY
Payer: COMMERCIAL

## 2020-08-03 PROCEDURE — 77301 RADIOTHERAPY DOSE PLAN IMRT: CPT | Performed by: RADIOLOGY

## 2020-08-03 PROCEDURE — 77338 DESIGN MLC DEVICE FOR IMRT: CPT | Performed by: RADIOLOGY

## 2020-08-03 PROCEDURE — 77300 RADIATION THERAPY DOSE PLAN: CPT | Performed by: RADIOLOGY

## 2020-08-10 ENCOUNTER — HOSPITAL ENCOUNTER (OUTPATIENT)
Dept: RADIATION ONCOLOGY | Facility: MEDICAL CENTER | Age: 60
End: 2020-08-10
Attending: RADIOLOGY
Payer: COMMERCIAL

## 2020-08-10 ENCOUNTER — HOSPITAL ENCOUNTER (OUTPATIENT)
Dept: RADIATION ONCOLOGY | Facility: MEDICAL CENTER | Age: 60
Discharge: HOME | End: 2020-08-10
Attending: RADIOLOGY
Payer: COMMERCIAL

## 2020-08-10 VITALS
SYSTOLIC BLOOD PRESSURE: 135 MMHG | WEIGHT: 187.25 LBS | OXYGEN SATURATION: 98 % | HEART RATE: 62 BPM | TEMPERATURE: 98.1 F | BODY MASS INDEX: 31.16 KG/M2 | RESPIRATION RATE: 16 BRPM | DIASTOLIC BLOOD PRESSURE: 80 MMHG

## 2020-08-10 PROCEDURE — 77014 PR CT GUIDANCE PLACEMENT RAD THERAPY FIELDS: CPT | Performed by: RADIOLOGY

## 2020-08-10 PROCEDURE — 77385 HC NTSTY MODUL RAD TX DLVR SMPL: CPT

## 2020-08-10 ASSESSMENT — PAIN DESCRIPTION - FREQUENCY: FREQUENCY: INTERMITTENT

## 2020-08-10 ASSESSMENT — PAIN SCALES - GENERAL: PAINLEVEL_OUTOF10: 0

## 2020-08-10 ASSESSMENT — PAIN DESCRIPTION - ONSET: ONSET: ON-GOING

## 2020-08-10 NOTE — PROGRESS NOTES
Patient: Emile Guevara     : 1960      Date of Service: 8/10/2020    107 [facility] Oncology  On Treatment Visit (OTV) Note    Diagnosis: Cancer Staging  Prostate cancer [facility])  Staging form: Prostate, AJCC 8th Edition  - Clinical stage from 2020: Stage IIIC (cT3b, cN0, cM0, PSA: 11.9, Grade Group: 5) - Signed by Jonatan Dioxn MD on 2020        Dose Accrued:180/7020 cGy    S: The patient she started today, is feeling well, and has no specific complaints. O: /80   Pulse 62   Temp 98.1 °F (36.7 °C) (Oral)   Resp 16   Wt 187 lb 4 oz (84.9 kg)   SpO2 98%   BMI 31.16 kg/m² . Well-appearing, in no apparent distress, alert and fully oriented, answers questions appropriately. IGRT: Set-up images acquired to ensure daily treatment accuracy and precision were reviewed by the physician. A&P: The patient is doing well, having to start radiation today. He has many questions regarding the indication for treatments and skin care. I answered all of his questions to his satisfaction. RT is to continue as planned.     Electronically signed by Maye Toledo MD on 8/10/2020 at 4:50 PM

## 2020-08-10 NOTE — PROGRESS NOTES
Spencer Zaragoza  8/10/2020  Wt Readings from Last 3 Encounters:   08/10/20 187 lb 4 oz (84.9 kg)   07/22/20 182 lb 0.8 oz (82.6 kg)   05/19/20 183 lb 8 oz (83.2 kg)     Body mass index is 31.16 kg/m². Pt here for first treatment, here for OTV. No issues to report at this time. Dr. Juan Citizen. Treatment Area:prostate    Patient was seen today for weekly visit. Comfort Alteration    Fatigue: None    Nutritional Alteration  Anorexia: No  Nausea: No  Vomiting: No     Elimination Alterations  Constipation: no  Diarrhea:  no  Urinary Frequency/Urgency: No  Urinary Retention: No  Dysuria: No  Urinary Incontinence: No  Proctitis: No  Nocturia: No #/night: 0     Emotional  Coping: effective    Sexuality Alteration  present    Skin Alteration   Sensation:none    Radiation Dermatitis:  Intact [x]     Erythema  []     Discoloration  []     Rash []     Dry desquamation  []     Moist desquamation []           Injury, potential bleeding or infection: none    Lab Results   Component Value Date    WBC 11.1 05/20/2020     05/20/2020         /80   Pulse 62   Temp 98.1 °F (36.7 °C) (Oral)   Resp 16   Wt 187 lb 4 oz (84.9 kg)   SpO2 98%   BMI 31.16 kg/m²   Patient Currently in Pain: No  Pain Assessment: 0-10  Pain Level: 0         Assessment/Plan: Patient was seen today for weekly visit.       Susana Ochoa

## 2020-08-11 ENCOUNTER — APPOINTMENT (OUTPATIENT)
Dept: RADIATION ONCOLOGY | Facility: MEDICAL CENTER | Age: 60
End: 2020-08-11
Attending: RADIOLOGY
Payer: COMMERCIAL

## 2020-08-11 ENCOUNTER — HOSPITAL ENCOUNTER (OUTPATIENT)
Dept: RADIATION ONCOLOGY | Facility: MEDICAL CENTER | Age: 60
Discharge: HOME | End: 2020-08-11
Attending: RADIOLOGY
Payer: COMMERCIAL

## 2020-08-11 PROCEDURE — 77385 HC NTSTY MODUL RAD TX DLVR SMPL: CPT

## 2020-08-11 PROCEDURE — 77014 PR CT GUIDANCE PLACEMENT RAD THERAPY FIELDS: CPT | Performed by: RADIOLOGY

## 2020-08-12 ENCOUNTER — HOSPITAL ENCOUNTER (OUTPATIENT)
Dept: RADIATION ONCOLOGY | Facility: MEDICAL CENTER | Age: 60
Discharge: HOME | End: 2020-08-12
Attending: RADIOLOGY
Payer: COMMERCIAL

## 2020-08-12 PROCEDURE — 77336 RADIATION PHYSICS CONSULT: CPT

## 2020-08-12 PROCEDURE — 77014 PR CT GUIDANCE PLACEMENT RAD THERAPY FIELDS: CPT | Performed by: RADIOLOGY

## 2020-08-12 PROCEDURE — 77385 HC NTSTY MODUL RAD TX DLVR SMPL: CPT

## 2020-08-13 ENCOUNTER — HOSPITAL ENCOUNTER (OUTPATIENT)
Dept: RADIATION ONCOLOGY | Facility: MEDICAL CENTER | Age: 60
Discharge: HOME | End: 2020-08-13
Attending: RADIOLOGY
Payer: COMMERCIAL

## 2020-08-13 PROCEDURE — 77385 HC NTSTY MODUL RAD TX DLVR SMPL: CPT

## 2020-08-13 PROCEDURE — 77014 PR CT GUIDANCE PLACEMENT RAD THERAPY FIELDS: CPT | Performed by: RADIOLOGY

## 2020-08-14 ENCOUNTER — HOSPITAL ENCOUNTER (OUTPATIENT)
Dept: RADIATION ONCOLOGY | Facility: MEDICAL CENTER | Age: 60
Discharge: HOME | End: 2020-08-14
Attending: RADIOLOGY
Payer: COMMERCIAL

## 2020-08-14 PROCEDURE — 77385 HC NTSTY MODUL RAD TX DLVR SMPL: CPT

## 2020-08-14 PROCEDURE — 77014 PR CT GUIDANCE PLACEMENT RAD THERAPY FIELDS: CPT | Performed by: RADIOLOGY

## 2020-08-14 PROCEDURE — 77427 RADIATION TX MANAGEMENT X5: CPT | Performed by: RADIOLOGY

## 2020-08-17 ENCOUNTER — HOSPITAL ENCOUNTER (OUTPATIENT)
Dept: RADIATION ONCOLOGY | Facility: MEDICAL CENTER | Age: 60
Discharge: HOME | End: 2020-08-17
Attending: RADIOLOGY
Payer: COMMERCIAL

## 2020-08-17 ENCOUNTER — HOSPITAL ENCOUNTER (OUTPATIENT)
Dept: RADIATION ONCOLOGY | Facility: MEDICAL CENTER | Age: 60
End: 2020-08-17
Attending: RADIOLOGY
Payer: COMMERCIAL

## 2020-08-17 VITALS
TEMPERATURE: 97.3 F | SYSTOLIC BLOOD PRESSURE: 127 MMHG | HEART RATE: 55 BPM | WEIGHT: 187.01 LBS | RESPIRATION RATE: 16 BRPM | DIASTOLIC BLOOD PRESSURE: 94 MMHG | BODY MASS INDEX: 31.12 KG/M2 | OXYGEN SATURATION: 97 %

## 2020-08-17 ASSESSMENT — PAIN SCALES - GENERAL: PAINLEVEL_OUTOF10: 0

## 2020-08-17 NOTE — PROGRESS NOTES
Patient: Daisy Moore     : 1960      Date of Service: 2020    107 [facility] Oncology  On Treatment Visit (OTV) Note    Diagnosis: Cancer Staging  Prostate cancer [facility])  Staging form: Prostate, AJCC 8th Edition  - Clinical stage from 2020: Stage IIIC (cT3b, cN0, cM0, PSA: 11.9, Grade Group: 5) - Signed by Mingo Cedillo MD on 2020        Dose Accrued: Completed 900 cGy out of 7020 cGy, 5 out of 39 treatments    S: No symptoms    O: BP (!) 127/94   Pulse 55   Temp 97.3 °F (36.3 °C) (Oral)   Resp 16   Wt 187 lb 0.2 oz (84.8 kg)   SpO2 97%   BMI 31.12 kg/m² . Well-appearing, in no apparent distress, alert and fully oriented, answers questions appropriately. No signs of acute radiation-induced toxicity on physical exam.    IGRT: Set-up images acquired to ensure daily treatment accuracy and precision were reviewed by the physician. A&P: Continue radiotherapy as planned. Moisturize treated area as instructed. We reviewed reasonably anticipated side effects in the upcoming weeks, as well as expected trajectory of recovery. Patient verbalized a good understanding to everything.     Electronically signed by Indira Gallegos MD on 2020 at 5:22 PM
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
## 2020-08-18 ENCOUNTER — HOSPITAL ENCOUNTER (OUTPATIENT)
Dept: RADIATION ONCOLOGY | Facility: MEDICAL CENTER | Age: 60
Discharge: HOME | End: 2020-08-18
Attending: RADIOLOGY
Payer: COMMERCIAL

## 2020-08-18 ENCOUNTER — SOCIAL WORK (OUTPATIENT)
Dept: ONCOLOGY | Age: 60
End: 2020-08-18

## 2020-08-18 PROCEDURE — 77014 PR CT GUIDANCE PLACEMENT RAD THERAPY FIELDS: CPT | Performed by: RADIOLOGY

## 2020-08-18 PROCEDURE — 77385 HC NTSTY MODUL RAD TX DLVR SMPL: CPT

## 2020-08-18 NOTE — PROGRESS NOTES
SW reviewed patient's psychosocial distress screen and patient has no needs currently. Patient's insurance is [employer] insurance in place through his employer. SW following.   JUSTIN Berry

## 2020-08-19 ENCOUNTER — HOSPITAL ENCOUNTER (OUTPATIENT)
Dept: RADIATION ONCOLOGY | Facility: MEDICAL CENTER | Age: 60
Discharge: HOME | End: 2020-08-19
Attending: RADIOLOGY
Payer: COMMERCIAL

## 2020-08-19 PROCEDURE — 77014 PR CT GUIDANCE PLACEMENT RAD THERAPY FIELDS: CPT | Performed by: RADIOLOGY

## 2020-08-19 PROCEDURE — 77385 HC NTSTY MODUL RAD TX DLVR SMPL: CPT

## 2020-08-20 ENCOUNTER — HOSPITAL ENCOUNTER (OUTPATIENT)
Dept: RADIATION ONCOLOGY | Facility: MEDICAL CENTER | Age: 60
Discharge: HOME | End: 2020-08-20
Attending: RADIOLOGY
Payer: COMMERCIAL

## 2020-08-20 PROCEDURE — 77014 PR CT GUIDANCE PLACEMENT RAD THERAPY FIELDS: CPT | Performed by: RADIOLOGY

## 2020-08-20 PROCEDURE — 77336 RADIATION PHYSICS CONSULT: CPT

## 2020-08-20 PROCEDURE — 77385 HC NTSTY MODUL RAD TX DLVR SMPL: CPT

## 2020-08-21 ENCOUNTER — HOSPITAL ENCOUNTER (OUTPATIENT)
Dept: RADIATION ONCOLOGY | Facility: MEDICAL CENTER | Age: 60
Discharge: HOME | End: 2020-08-21
Attending: RADIOLOGY
Payer: COMMERCIAL

## 2020-08-21 PROCEDURE — 77014 PR CT GUIDANCE PLACEMENT RAD THERAPY FIELDS: CPT | Performed by: RADIOLOGY

## 2020-08-21 PROCEDURE — 77385 HC NTSTY MODUL RAD TX DLVR SMPL: CPT

## 2020-08-24 ENCOUNTER — HOSPITAL ENCOUNTER (OUTPATIENT)
Dept: RADIATION ONCOLOGY | Facility: MEDICAL CENTER | Age: 60
Discharge: HOME | End: 2020-08-24
Attending: RADIOLOGY
Payer: COMMERCIAL

## 2020-08-24 VITALS
DIASTOLIC BLOOD PRESSURE: 83 MMHG | BODY MASS INDEX: 31.46 KG/M2 | WEIGHT: 189.03 LBS | RESPIRATION RATE: 16 BRPM | SYSTOLIC BLOOD PRESSURE: 139 MMHG | TEMPERATURE: 97.4 F | OXYGEN SATURATION: 98 % | HEART RATE: 60 BPM

## 2020-08-24 PROCEDURE — 77014 PR CT GUIDANCE PLACEMENT RAD THERAPY FIELDS: CPT | Performed by: RADIOLOGY

## 2020-08-24 PROCEDURE — 77427 RADIATION TX MANAGEMENT X5: CPT | Performed by: RADIOLOGY

## 2020-08-24 PROCEDURE — 77385 HC NTSTY MODUL RAD TX DLVR SMPL: CPT

## 2020-08-24 NOTE — PROGRESS NOTES
Kia Rios  8/24/2020  Wt Readings from Last 3 Encounters:   08/24/20 189 lb 0.4 oz (85.7 kg)   08/17/20 187 lb 0.2 oz (84.8 kg)   08/10/20 187 lb 4 oz (84.9 kg)     Body mass index is 31.46 kg/m². PT here for OTV. No changes. Dr. Marbella Quigley to eval.     Treatment Area:prostate    Patient was seen today for weekly visit. Comfort Alteration    Fatigue: None    Nutritional Alteration  Anorexia: No  Nausea: No  Vomiting: No     Elimination Alterations  Constipation: no  Diarrhea:  yes  Urinary Frequency/Urgency: No  Urinary Retention: No  Dysuria: No  Urinary Incontinence: No  Proctitis: No  Nocturia: No #/night: 0     Emotional  Coping: effective      Skin Alteration   Sensation:none    Radiation Dermatitis:  Intact [x]     Erythema  []     Discoloration  []     Rash []     Dry desquamation  []     Moist desquamation []           Injury, potential bleeding or infection: none    Lab Results   Component Value Date    WBC 11.1 05/20/2020     05/20/2020         /83   Pulse 60   Temp 97.4 °F (36.3 °C) (Oral)   Resp 16   Wt 189 lb 0.4 oz (85.7 kg)   SpO2 98%   BMI 31.46 kg/m²   Patient Currently in Pain: No               Assessment/Plan: Patient was seen today for weekly visit.       Aris Sarah

## 2020-08-25 ENCOUNTER — HOSPITAL ENCOUNTER (OUTPATIENT)
Dept: RADIATION ONCOLOGY | Facility: MEDICAL CENTER | Age: 60
Discharge: HOME | End: 2020-08-25
Attending: RADIOLOGY
Payer: COMMERCIAL

## 2020-08-25 PROCEDURE — 77385 HC NTSTY MODUL RAD TX DLVR SMPL: CPT

## 2020-08-25 PROCEDURE — 77014 PR CT GUIDANCE PLACEMENT RAD THERAPY FIELDS: CPT | Performed by: RADIOLOGY

## 2020-08-26 ENCOUNTER — HOSPITAL ENCOUNTER (OUTPATIENT)
Dept: RADIATION ONCOLOGY | Facility: MEDICAL CENTER | Age: 60
Discharge: HOME | End: 2020-08-26
Attending: RADIOLOGY
Payer: COMMERCIAL

## 2020-08-26 PROCEDURE — 77385 HC NTSTY MODUL RAD TX DLVR SMPL: CPT

## 2020-08-26 PROCEDURE — 77014 PR CT GUIDANCE PLACEMENT RAD THERAPY FIELDS: CPT | Performed by: RADIOLOGY

## 2020-08-27 ENCOUNTER — HOSPITAL ENCOUNTER (OUTPATIENT)
Dept: RADIATION ONCOLOGY | Facility: MEDICAL CENTER | Age: 60
Discharge: HOME | End: 2020-08-27
Attending: RADIOLOGY
Payer: COMMERCIAL

## 2020-08-27 PROCEDURE — 77336 RADIATION PHYSICS CONSULT: CPT

## 2020-08-27 PROCEDURE — 77385 HC NTSTY MODUL RAD TX DLVR SMPL: CPT

## 2020-08-27 PROCEDURE — 77014 PR CT GUIDANCE PLACEMENT RAD THERAPY FIELDS: CPT | Performed by: RADIOLOGY

## 2020-08-28 ENCOUNTER — HOSPITAL ENCOUNTER (OUTPATIENT)
Dept: RADIATION ONCOLOGY | Facility: MEDICAL CENTER | Age: 60
Discharge: HOME | End: 2020-08-28
Attending: RADIOLOGY
Payer: COMMERCIAL

## 2020-08-28 PROCEDURE — 77014 PR CT GUIDANCE PLACEMENT RAD THERAPY FIELDS: CPT | Performed by: RADIOLOGY

## 2020-08-28 PROCEDURE — 77385 HC NTSTY MODUL RAD TX DLVR SMPL: CPT

## 2020-08-31 ENCOUNTER — HOSPITAL ENCOUNTER (OUTPATIENT)
Dept: RADIATION ONCOLOGY | Facility: MEDICAL CENTER | Age: 60
Discharge: HOME | End: 2020-08-31
Attending: RADIOLOGY
Payer: COMMERCIAL

## 2020-08-31 VITALS
OXYGEN SATURATION: 98 % | SYSTOLIC BLOOD PRESSURE: 135 MMHG | HEART RATE: 64 BPM | TEMPERATURE: 97.6 F | BODY MASS INDEX: 31.45 KG/M2 | DIASTOLIC BLOOD PRESSURE: 86 MMHG | WEIGHT: 189 LBS | RESPIRATION RATE: 16 BRPM

## 2020-08-31 PROCEDURE — 77014 PR CT GUIDANCE PLACEMENT RAD THERAPY FIELDS: CPT | Performed by: RADIOLOGY

## 2020-08-31 PROCEDURE — 77385 HC NTSTY MODUL RAD TX DLVR SMPL: CPT

## 2020-08-31 PROCEDURE — 77427 RADIATION TX MANAGEMENT X5: CPT | Performed by: RADIOLOGY

## 2020-08-31 NOTE — PROGRESS NOTES
Emile Coad  8/31/2020  Wt Readings from Last 3 Encounters:   08/31/20 189 lb (85.7 kg)   08/24/20 189 lb 0.4 oz (85.7 kg)   08/17/20 187 lb 0.2 oz (84.8 kg)     Body mass index is 31.45 kg/m². Patient here for OTV. Patient states when he urinates he also has a BM. It is soft and formed. Dr. Teresa Colon to [facility].     Treatment Area:prostate    Patient was seen today for weekly visit. Comfort Alteration    Fatigue: None    Nutritional Alteration  Anorexia: No  Nausea: No  Vomiting: No     Elimination Alterations  Constipation: no  Diarrhea:  no  Urinary Frequency/Urgency: No  Urinary Retention: No  Dysuria: No  Urinary Incontinence: No  Proctitis: No  Nocturia: No #/night: 0     Emotional  Coping: effective          Skin Alteration   Sensation:none    Radiation Dermatitis:  Intact [x]     Erythema  []     Discoloration  []     Rash []     Dry desquamation  []     Moist desquamation []           Injury, potential bleeding or infection: none    Lab Results   Component Value Date    WBC 11.1 05/20/2020     05/20/2020         /86   Pulse 64   Temp 97.6 °F (36.4 °C) (Oral)   Resp 16   Wt 189 lb (85.7 kg)   SpO2 98%   BMI 31.45 kg/m²   Patient Currently in Pain: No               Assessment/Plan: Patient was seen today for weekly visit.       Juan Estrada

## 2020-08-31 NOTE — PROGRESS NOTES
Patient: Almita Gross     : 1960      Date of Service: 2020    107 [facility] Oncology  On Treatment Visit (OTV) Note    Diagnosis: Cancer Staging  Prostate cancer [facility])  Staging form: Prostate, AJCC 8th Edition  - Clinical stage from 2020: Stage IIIC (cT3b, cN0, cM0, PSA: 11.9, Grade Group: 5) - Signed by Mariam Ramirez MD on 2020        Dose Accrued: He completed 2700 cGy out of 7020 cGy, 16 out of 39 treatment. S: Patient presents stool while urinating but denied any looseness of his stool. He does that 3-4 times per day. O: /86   Pulse 64   Temp 97.6 °F (36.4 °C) (Oral)   Resp 16   Wt 189 lb (85.7 kg)   SpO2 98%   BMI 31.45 kg/m² . Well-appearing, in no apparent distress, alert and fully oriented, answers questions appropriately. No signs of acute radiation-induced toxicity on physical exam.    IGRT: Set-up images acquired to ensure daily treatment accuracy and precision were reviewed by the physician. A&P: Continue radiotherapy as planned. Moisturize treated area as instructed. We reviewed reasonably anticipated side effects in the upcoming weeks, as well as expected trajectory of recovery. Patient verbalized a good understanding to everything.     Electronically signed by Herminio Downs MD on 2020 at 4:28 PM

## 2020-09-01 ENCOUNTER — HOSPITAL ENCOUNTER (OUTPATIENT)
Dept: RADIATION ONCOLOGY | Facility: MEDICAL CENTER | Age: 60
Discharge: HOME | End: 2020-09-01
Attending: RADIOLOGY
Payer: COMMERCIAL

## 2020-09-01 PROCEDURE — 77385 HC NTSTY MODUL RAD TX DLVR SMPL: CPT

## 2020-09-01 PROCEDURE — 77014 PR CT GUIDANCE PLACEMENT RAD THERAPY FIELDS: CPT | Performed by: RADIOLOGY

## 2020-09-02 ENCOUNTER — HOSPITAL ENCOUNTER (OUTPATIENT)
Dept: RADIATION ONCOLOGY | Facility: MEDICAL CENTER | Age: 60
Discharge: HOME | End: 2020-09-02
Attending: RADIOLOGY
Payer: COMMERCIAL

## 2020-09-02 PROCEDURE — 77336 RADIATION PHYSICS CONSULT: CPT

## 2020-09-02 PROCEDURE — 77014 PR CT GUIDANCE PLACEMENT RAD THERAPY FIELDS: CPT | Performed by: RADIOLOGY

## 2020-09-02 PROCEDURE — 77385 HC NTSTY MODUL RAD TX DLVR SMPL: CPT

## 2020-09-03 ENCOUNTER — HOSPITAL ENCOUNTER (OUTPATIENT)
Dept: RADIATION ONCOLOGY | Facility: MEDICAL CENTER | Age: 60
Discharge: HOME | End: 2020-09-03
Attending: RADIOLOGY
Payer: COMMERCIAL

## 2020-09-03 PROCEDURE — 77014 PR CT GUIDANCE PLACEMENT RAD THERAPY FIELDS: CPT | Performed by: RADIOLOGY

## 2020-09-03 PROCEDURE — 77385 HC NTSTY MODUL RAD TX DLVR SMPL: CPT

## 2020-09-04 ENCOUNTER — HOSPITAL ENCOUNTER (OUTPATIENT)
Dept: RADIATION ONCOLOGY | Facility: MEDICAL CENTER | Age: 60
Discharge: HOME | End: 2020-09-04
Attending: RADIOLOGY
Payer: COMMERCIAL

## 2020-09-04 PROCEDURE — 77014 PR CT GUIDANCE PLACEMENT RAD THERAPY FIELDS: CPT | Performed by: RADIOLOGY

## 2020-09-04 PROCEDURE — 77385 HC NTSTY MODUL RAD TX DLVR SMPL: CPT

## 2020-09-08 ENCOUNTER — HOSPITAL ENCOUNTER (OUTPATIENT)
Dept: RADIATION ONCOLOGY | Facility: MEDICAL CENTER | Age: 60
Discharge: HOME | End: 2020-09-08
Attending: RADIOLOGY
Payer: COMMERCIAL

## 2020-09-08 PROCEDURE — 77427 RADIATION TX MANAGEMENT X5: CPT | Performed by: RADIOLOGY

## 2020-09-08 PROCEDURE — 77014 PR CT GUIDANCE PLACEMENT RAD THERAPY FIELDS: CPT | Performed by: RADIOLOGY

## 2020-09-08 PROCEDURE — 77385 HC NTSTY MODUL RAD TX DLVR SMPL: CPT

## 2020-09-09 ENCOUNTER — HOSPITAL ENCOUNTER (OUTPATIENT)
Dept: RADIATION ONCOLOGY | Facility: MEDICAL CENTER | Age: 60
Discharge: HOME | End: 2020-09-09
Attending: RADIOLOGY
Payer: COMMERCIAL

## 2020-09-09 VITALS
BODY MASS INDEX: 31.22 KG/M2 | DIASTOLIC BLOOD PRESSURE: 91 MMHG | WEIGHT: 187.6 LBS | HEART RATE: 61 BPM | SYSTOLIC BLOOD PRESSURE: 137 MMHG | OXYGEN SATURATION: 96 % | TEMPERATURE: 97.2 F | RESPIRATION RATE: 16 BRPM

## 2020-09-09 PROCEDURE — 77385 HC NTSTY MODUL RAD TX DLVR SMPL: CPT

## 2020-09-09 PROCEDURE — 77336 RADIATION PHYSICS CONSULT: CPT

## 2020-09-09 PROCEDURE — 77014 PR CT GUIDANCE PLACEMENT RAD THERAPY FIELDS: CPT | Performed by: RADIOLOGY

## 2020-09-09 NOTE — PROGRESS NOTES
Patient: Ulysses Adler     : 1960      Date of Service: 2020    [facility] Radiation Oncology  On Treatment Visit (OTV) Note    Diagnosis: Cancer Staging  Prostate cancer [facility])  Staging form: Prostate, AJCC 8th Edition  - Clinical stage from 2020: Stage IIIC (cT3b, cN0, cM0, PSA: 11.9, Grade Group: 5) - Signed by Erik Santoyo MD on 2020        Dose Accrued: Completed 3780 cGy out of 7020 cGy, 21 out of 39 treatments. S: No complaint    O: BP (!) 137/91   Pulse 61   Temp 97.2 °F (36.2 °C) (Temporal)   Resp 16   Wt 187 lb 9.6 oz (85.1 kg)   SpO2 96%   BMI 31.22 kg/m² . Well-appearing, in no apparent distress, alert and fully oriented, answers questions appropriately. No signs of acute radiation-induced toxicity on physical exam.    IGRT: Set-up images acquired to ensure daily treatment accuracy and precision were reviewed by the physician. A&P: Continue radiotherapy as planned. Moisturize treated area as instructed. We reviewed reasonably anticipated side effects in the upcoming weeks, as well as expected trajectory of recovery. Patient verbalized a good understanding to everything.     Electronically signed by Stacie Vásquez MD on 2020 at 4:00 PM

## 2020-09-10 ENCOUNTER — HOSPITAL ENCOUNTER (OUTPATIENT)
Dept: RADIATION ONCOLOGY | Facility: MEDICAL CENTER | Age: 60
Discharge: HOME | End: 2020-09-10
Attending: RADIOLOGY
Payer: COMMERCIAL

## 2020-09-10 PROCEDURE — 77014 PR CT GUIDANCE PLACEMENT RAD THERAPY FIELDS: CPT | Performed by: RADIOLOGY

## 2020-09-10 PROCEDURE — 77385 HC NTSTY MODUL RAD TX DLVR SMPL: CPT

## 2020-09-11 ENCOUNTER — HOSPITAL ENCOUNTER (OUTPATIENT)
Dept: RADIATION ONCOLOGY | Facility: MEDICAL CENTER | Age: 60
Discharge: HOME | End: 2020-09-11
Attending: RADIOLOGY
Payer: COMMERCIAL

## 2020-09-11 PROCEDURE — 77014 PR CT GUIDANCE PLACEMENT RAD THERAPY FIELDS: CPT | Performed by: RADIOLOGY

## 2020-09-11 PROCEDURE — 77385 HC NTSTY MODUL RAD TX DLVR SMPL: CPT

## 2020-09-14 ENCOUNTER — HOSPITAL ENCOUNTER (OUTPATIENT)
Dept: RADIATION ONCOLOGY | Facility: MEDICAL CENTER | Age: 60
Discharge: HOME | End: 2020-09-14
Attending: RADIOLOGY
Payer: COMMERCIAL

## 2020-09-14 VITALS
TEMPERATURE: 97.3 F | BODY MASS INDEX: 30.79 KG/M2 | DIASTOLIC BLOOD PRESSURE: 79 MMHG | HEART RATE: 55 BPM | OXYGEN SATURATION: 96 % | SYSTOLIC BLOOD PRESSURE: 118 MMHG | RESPIRATION RATE: 16 BRPM | WEIGHT: 185 LBS

## 2020-09-14 PROCEDURE — 77385 HC NTSTY MODUL RAD TX DLVR SMPL: CPT

## 2020-09-14 PROCEDURE — 77014 PR CT GUIDANCE PLACEMENT RAD THERAPY FIELDS: CPT | Performed by: RADIOLOGY

## 2020-09-15 ENCOUNTER — HOSPITAL ENCOUNTER (OUTPATIENT)
Dept: RADIATION ONCOLOGY | Facility: MEDICAL CENTER | Age: 60
Discharge: HOME | End: 2020-09-15
Attending: RADIOLOGY
Payer: COMMERCIAL

## 2020-09-15 PROCEDURE — 77427 RADIATION TX MANAGEMENT X5: CPT | Performed by: RADIOLOGY

## 2020-09-15 PROCEDURE — 77385 HC NTSTY MODUL RAD TX DLVR SMPL: CPT

## 2020-09-15 PROCEDURE — 77014 PR CT GUIDANCE PLACEMENT RAD THERAPY FIELDS: CPT | Performed by: RADIOLOGY

## 2020-09-15 NOTE — PROGRESS NOTES
Nutrition Note    Wt Readings from Last 7 Encounters:   09/14/20 185 lb (83.9 kg)   09/09/20 187 lb 9.6 oz (85.1 kg)   08/31/20 189 lb (85.7 kg)   08/24/20 189 lb 0.4 oz (85.7 kg)   08/17/20 187 lb 0.2 oz (84.8 kg)   08/10/20 187 lb 4 oz (84.9 kg)   07/22/20 182 lb 0.8 oz (82.6 kg)        Evaluation:  Noted weight fluctuations since start of treatment. Overall, weight remains stable. Pt. Denies nutritional alterations (anorexia, N/V/D) at this time. Recommendations:  1.  Continue to monitor weight trend and nutritional alterations      Kim Gomez RDN, LAURA  RD Office Phone: [phone]

## 2020-09-16 ENCOUNTER — HOSPITAL ENCOUNTER (OUTPATIENT)
Dept: RADIATION ONCOLOGY | Facility: MEDICAL CENTER | Age: 60
Discharge: HOME | End: 2020-09-16
Attending: RADIOLOGY
Payer: COMMERCIAL

## 2020-09-16 PROCEDURE — 77385 HC NTSTY MODUL RAD TX DLVR SMPL: CPT

## 2020-09-16 PROCEDURE — 77336 RADIATION PHYSICS CONSULT: CPT

## 2020-09-16 PROCEDURE — 77014 PR CT GUIDANCE PLACEMENT RAD THERAPY FIELDS: CPT | Performed by: RADIOLOGY

## 2020-09-17 ENCOUNTER — HOSPITAL ENCOUNTER (OUTPATIENT)
Dept: RADIATION ONCOLOGY | Facility: MEDICAL CENTER | Age: 60
Discharge: HOME | End: 2020-09-17
Attending: RADIOLOGY
Payer: COMMERCIAL

## 2020-09-17 ENCOUNTER — TELEPHONE (OUTPATIENT)
Dept: RADIATION ONCOLOGY | Facility: MEDICAL CENTER | Age: 60
End: 2020-09-17

## 2020-09-17 PROCEDURE — 77014 PR CT GUIDANCE PLACEMENT RAD THERAPY FIELDS: CPT | Performed by: RADIOLOGY

## 2020-09-17 PROCEDURE — 77385 HC NTSTY MODUL RAD TX DLVR SMPL: CPT

## 2020-09-18 ENCOUNTER — HOSPITAL ENCOUNTER (OUTPATIENT)
Dept: RADIATION ONCOLOGY | Facility: MEDICAL CENTER | Age: 60
Discharge: HOME | End: 2020-09-18
Attending: RADIOLOGY
Payer: COMMERCIAL

## 2020-09-18 PROCEDURE — 77385 HC NTSTY MODUL RAD TX DLVR SMPL: CPT

## 2020-09-18 PROCEDURE — 77014 PR CT GUIDANCE PLACEMENT RAD THERAPY FIELDS: CPT | Performed by: RADIOLOGY

## 2020-09-21 ENCOUNTER — HOSPITAL ENCOUNTER (OUTPATIENT)
Dept: RADIATION ONCOLOGY | Facility: MEDICAL CENTER | Age: 60
Discharge: HOME | End: 2020-09-21
Attending: RADIOLOGY
Payer: COMMERCIAL

## 2020-09-21 VITALS
WEIGHT: 184.5 LBS | DIASTOLIC BLOOD PRESSURE: 86 MMHG | OXYGEN SATURATION: 98 % | TEMPERATURE: 96.8 F | SYSTOLIC BLOOD PRESSURE: 130 MMHG | HEART RATE: 67 BPM | RESPIRATION RATE: 16 BRPM | BODY MASS INDEX: 30.7 KG/M2

## 2020-09-21 PROCEDURE — 77014 PR CT GUIDANCE PLACEMENT RAD THERAPY FIELDS: CPT | Performed by: RADIOLOGY

## 2020-09-21 PROCEDURE — 77385 HC NTSTY MODUL RAD TX DLVR SMPL: CPT

## 2020-09-21 NOTE — PROGRESS NOTES
Edward Palomino  9/21/2020  Wt Readings from Last 3 Encounters:   09/21/20 184 lb 8 oz (83.7 kg)   09/14/20 185 lb (83.9 kg)   09/09/20 187 lb 9.6 oz (85.1 kg)     Body mass index is 30.7 kg/m². Pt here for OTV. Patient denies any new issues. Dr. Sindy Blair to [facility].      Treatment Area:prostate    Patient was seen today for weekly visit. Comfort Alteration    Fatigue: None    Nutritional Alteration  Anorexia: No  Nausea: No  Vomiting: No     Elimination Alterations  Constipation: no  Diarrhea:  no  Urinary Frequency/Urgency: Yes  Urinary Retention: No  Dysuria: No  Urinary Incontinence: No  Proctitis: No  Nocturia: No #/night: 0     Emotional  Coping: effective          Skin Alteration   Sensation:none    Radiation Dermatitis:  Intact [x]     Erythema  []     Discoloration  []     Rash []     Dry desquamation  []     Moist desquamation []           Injury, potential bleeding or infection: none    Lab Results   Component Value Date    WBC 11.1 05/20/2020     05/20/2020         /86   Pulse 67   Temp 96.8 °F (36 °C) (Oral)   Resp 16   Wt 184 lb 8 oz (83.7 kg)   SpO2 98%   BMI 30.70 kg/m²   Patient Currently in Pain: No               Assessment/Plan: Patient was seen today for weekly visit.       Yumiko Khalil

## 2020-09-21 NOTE — PROGRESS NOTES
Patient: Keary Galeazzi     : 1960      Date of Service: 2020    [facility] Oncology  On Treatment Visit (OTV) Note    Diagnosis: Cancer Staging  Prostate cancer [facility])  Staging form: Prostate, AJCC 8th Edition  - Clinical stage from 2020: Stage IIIC (cT3b, cN0, cM0, PSA: 11.9, Grade Group: 5) - Signed by Aydin Mcgee MD on 2020        Dose Accrued: Completed 5220 cGy out of 7020 cGy, 29 out of 39 treatment. S: Patient denied any significant symptoms. O: /86   Pulse 67   Temp 96.8 °F (36 °C) (Oral)   Resp 16   Wt 184 lb 8 oz (83.7 kg)   SpO2 98%   BMI 30.70 kg/m² . Well-appearing, in no apparent distress, alert and fully oriented, answers questions appropriately. No signs of acute radiation-induced toxicity on physical exam.    IGRT: Set-up images acquired to ensure daily treatment accuracy and precision were reviewed by the physician. A&P: Continue radiotherapy as planned. Moisturize treated area as instructed. We reviewed reasonably anticipated side effects in the upcoming weeks, as well as expected trajectory of recovery. Patient verbalized a good understanding to everything.     Electronically signed by Niall Beauchamp MD on 2020 at 6:12 PM

## 2020-09-22 ENCOUNTER — HOSPITAL ENCOUNTER (OUTPATIENT)
Dept: RADIATION ONCOLOGY | Facility: MEDICAL CENTER | Age: 60
Discharge: HOME | End: 2020-09-22
Attending: RADIOLOGY
Payer: COMMERCIAL

## 2020-09-22 PROCEDURE — 77427 RADIATION TX MANAGEMENT X5: CPT | Performed by: RADIOLOGY

## 2020-09-22 PROCEDURE — 77385 HC NTSTY MODUL RAD TX DLVR SMPL: CPT

## 2020-09-22 PROCEDURE — 77014 PR CT GUIDANCE PLACEMENT RAD THERAPY FIELDS: CPT | Performed by: RADIOLOGY

## 2020-09-23 ENCOUNTER — HOSPITAL ENCOUNTER (OUTPATIENT)
Dept: RADIATION ONCOLOGY | Facility: MEDICAL CENTER | Age: 60
Discharge: HOME | End: 2020-09-23
Attending: RADIOLOGY
Payer: COMMERCIAL

## 2020-09-23 PROCEDURE — 77385 HC NTSTY MODUL RAD TX DLVR SMPL: CPT

## 2020-09-23 PROCEDURE — 77014 PR CT GUIDANCE PLACEMENT RAD THERAPY FIELDS: CPT | Performed by: RADIOLOGY

## 2020-09-24 ENCOUNTER — HOSPITAL ENCOUNTER (OUTPATIENT)
Dept: RADIATION ONCOLOGY | Facility: MEDICAL CENTER | Age: 60
Discharge: HOME | End: 2020-09-24
Attending: RADIOLOGY
Payer: COMMERCIAL

## 2020-09-24 PROCEDURE — 77014 PR CT GUIDANCE PLACEMENT RAD THERAPY FIELDS: CPT | Performed by: RADIOLOGY

## 2020-09-24 PROCEDURE — 77385 HC NTSTY MODUL RAD TX DLVR SMPL: CPT

## 2020-09-25 ENCOUNTER — HOSPITAL ENCOUNTER (OUTPATIENT)
Dept: RADIATION ONCOLOGY | Facility: MEDICAL CENTER | Age: 60
Discharge: HOME | End: 2020-09-25
Attending: RADIOLOGY
Payer: COMMERCIAL

## 2020-09-25 PROCEDURE — 77385 HC NTSTY MODUL RAD TX DLVR SMPL: CPT

## 2020-09-25 PROCEDURE — 77014 PR CT GUIDANCE PLACEMENT RAD THERAPY FIELDS: CPT | Performed by: RADIOLOGY

## 2020-09-28 ENCOUNTER — HOSPITAL ENCOUNTER (OUTPATIENT)
Dept: RADIATION ONCOLOGY | Facility: MEDICAL CENTER | Age: 60
Discharge: HOME | End: 2020-09-28
Attending: RADIOLOGY
Payer: COMMERCIAL

## 2020-09-28 VITALS
WEIGHT: 187.38 LBS | RESPIRATION RATE: 16 BRPM | DIASTOLIC BLOOD PRESSURE: 85 MMHG | BODY MASS INDEX: 31.18 KG/M2 | HEART RATE: 68 BPM | OXYGEN SATURATION: 96 % | SYSTOLIC BLOOD PRESSURE: 130 MMHG | TEMPERATURE: 97 F

## 2020-09-28 PROCEDURE — 77014 PR CT GUIDANCE PLACEMENT RAD THERAPY FIELDS: CPT | Performed by: RADIOLOGY

## 2020-09-28 PROCEDURE — 77385 HC NTSTY MODUL RAD TX DLVR SMPL: CPT

## 2020-09-28 NOTE — PROGRESS NOTES
Andrey Bobby  9/28/2020  Wt Readings from Last 3 Encounters:   09/28/20 187 lb 6 oz (85 kg)   09/21/20 184 lb 8 oz (83.7 kg)   09/14/20 185 lb (83.9 kg)     Body mass index is 31.18 kg/m². Pt here for OTV. Patient denies any new symptoms.  to [facility].    Treatment Area:prostate    Patient was seen today for weekly visit. Comfort Alteration    Fatigue: None    Nutritional Alteration  Anorexia: No  Nausea: No  Vomiting: No     Elimination Alterations  Constipation: no  Diarrhea:  no  Urinary Frequency/Urgency: Yes  Urinary Retention: No  Dysuria: No  Urinary Incontinence: No  Proctitis: No  Nocturia: No #/night: 0     Emotional  Coping: effective      Skin Alteration   Sensation:none    Radiation Dermatitis:  Intact [x]     Erythema  []     Discoloration  []     Rash []     Dry desquamation  []     Moist desquamation []           Injury, potential bleeding or infection: none    Lab Results   Component Value Date    WBC 11.1 05/20/2020     05/20/2020         /85   Pulse 68   Temp 97 °F (36.1 °C) (Oral)   Resp 16   Wt 187 lb 6 oz (85 kg)   SpO2 96%   BMI 31.18 kg/m²                   Assessment/Plan: Patient was seen today for weekly visit.       Aretha Keller

## 2020-09-29 ENCOUNTER — HOSPITAL ENCOUNTER (OUTPATIENT)
Dept: RADIATION ONCOLOGY | Facility: MEDICAL CENTER | Age: 60
Discharge: HOME | End: 2020-09-29
Attending: RADIOLOGY
Payer: COMMERCIAL

## 2020-09-29 PROCEDURE — 77014 PR CT GUIDANCE PLACEMENT RAD THERAPY FIELDS: CPT | Performed by: RADIOLOGY

## 2020-09-29 PROCEDURE — 77385 HC NTSTY MODUL RAD TX DLVR SMPL: CPT

## 2020-09-30 ENCOUNTER — HOSPITAL ENCOUNTER (OUTPATIENT)
Dept: RADIATION ONCOLOGY | Facility: MEDICAL CENTER | Age: 60
Discharge: HOME | End: 2020-09-30
Attending: RADIOLOGY
Payer: COMMERCIAL

## 2020-09-30 PROCEDURE — 77336 RADIATION PHYSICS CONSULT: CPT

## 2020-09-30 PROCEDURE — 77014 PR CT GUIDANCE PLACEMENT RAD THERAPY FIELDS: CPT | Performed by: RADIOLOGY

## 2020-09-30 PROCEDURE — 77385 HC NTSTY MODUL RAD TX DLVR SMPL: CPT

## 2020-10-01 ENCOUNTER — HOSPITAL ENCOUNTER (OUTPATIENT)
Dept: RADIATION ONCOLOGY | Facility: MEDICAL CENTER | Age: 60
Discharge: HOME | End: 2020-10-01
Attending: RADIOLOGY
Payer: COMMERCIAL

## 2020-10-01 PROCEDURE — 77014 PR CT GUIDANCE PLACEMENT RAD THERAPY FIELDS: CPT | Performed by: RADIOLOGY

## 2020-10-01 PROCEDURE — 77385 HC NTSTY MODUL RAD TX DLVR SMPL: CPT

## 2020-10-02 ENCOUNTER — HOSPITAL ENCOUNTER (OUTPATIENT)
Dept: RADIATION ONCOLOGY | Facility: MEDICAL CENTER | Age: 60
Discharge: HOME | End: 2020-10-02
Attending: RADIOLOGY
Payer: COMMERCIAL

## 2020-10-02 PROCEDURE — 77385 HC NTSTY MODUL RAD TX DLVR SMPL: CPT

## 2020-10-02 PROCEDURE — 77014 PR CT GUIDANCE PLACEMENT RAD THERAPY FIELDS: CPT | Performed by: RADIOLOGY

## 2020-10-05 ENCOUNTER — HOSPITAL ENCOUNTER (OUTPATIENT)
Dept: RADIATION ONCOLOGY | Facility: MEDICAL CENTER | Age: 60
Discharge: HOME | End: 2020-10-05
Attending: RADIOLOGY
Payer: COMMERCIAL

## 2020-10-05 VITALS
DIASTOLIC BLOOD PRESSURE: 96 MMHG | SYSTOLIC BLOOD PRESSURE: 146 MMHG | OXYGEN SATURATION: 98 % | HEART RATE: 75 BPM | BODY MASS INDEX: 31.47 KG/M2 | RESPIRATION RATE: 16 BRPM | TEMPERATURE: 96.6 F | WEIGHT: 189.13 LBS

## 2020-10-05 PROCEDURE — 77014 PR CT GUIDANCE PLACEMENT RAD THERAPY FIELDS: CPT | Performed by: RADIOLOGY

## 2020-10-05 PROCEDURE — 77427 RADIATION TX MANAGEMENT X5: CPT | Performed by: RADIOLOGY

## 2020-10-05 PROCEDURE — 77385 HC NTSTY MODUL RAD TX DLVR SMPL: CPT

## 2020-10-05 ASSESSMENT — PAIN DESCRIPTION - FREQUENCY: FREQUENCY: INTERMITTENT

## 2020-10-05 ASSESSMENT — PAIN DESCRIPTION - ONSET: ONSET: ON-GOING

## 2020-10-05 NOTE — PROGRESS NOTES
Patient: Cr Helm     : 1960      Date of Service: 10/5/2020    [facility] Oncology  On Treatment Visit (OTV) Note    Diagnosis: Cancer Staging  Prostate cancer [facility])  Staging form: Prostate, AJCC 8th Edition  - Clinical stage from 2020: Stage IIIC (cT3b, cN0, cM0, PSA: 11.9, Grade Group: 5) - Signed by Em Nunes MD on 2020        Dose Accrued:Complained of mild degree of sore throat completed 7020cGy out of 7020 cGy, 39 out of 39 treatments. S: He has no complaint. O: BP (!) 146/96   Pulse 75   Temp 96.6 °F (35.9 °C) (Oral)   Resp 16   Wt 189 lb 2 oz (85.8 kg)   SpO2 98%   BMI 31.47 kg/m² . Well-appearing, in no apparent distress, alert and fully oriented, answers questions appropriately. No signs of acute radiation-induced toxicity on physical exam.    IGRT: Set-up images acquired to ensure daily treatment accuracy and precision were reviewed by the physician. A&P: He completed his course of radiation treatment with no significant problems. He was given a follow-up appointment to see us. He will be seeing his urologist and other physicians. We like to thank you very much for letting us participate in his care and will keep you updated on him. .    Electronically signed by Tiki Ortiz MD on 10/5/2020 at 4:05 PM

## 2020-10-05 NOTE — PROGRESS NOTES
Micky Mcdaniels  10/5/2020  Wt Readings from Last 3 Encounters:   10/05/20 189 lb 2 oz (85.8 kg)   09/28/20 187 lb 6 oz (85 kg)   09/21/20 184 lb 8 oz (83.7 kg)     Body mass index is 31.47 kg/m². PT here for OTV. No problems. Follow up appt given. Dr. Chloé Freitas to give. Treatment Area:prostate    Patient was seen today for weekly visit. Comfort Alteration    Fatigue: None    Nutritional Alteration  Anorexia: No  Nausea: No  Vomiting: No     Elimination Alterations  Constipation: no  Diarrhea:  no  Urinary Frequency/Urgency: Yes  Urinary Retention: No  Dysuria: No  Urinary Incontinence: No  Proctitis: No  Nocturia: No #/night: 0     Emotional  Coping: effective    Sexuality Alteration      Skin Alteration   Sensation:none    Radiation Dermatitis:  Intact [x]     Erythema  []     Discoloration  []     Rash []     Dry desquamation  []     Moist desquamation []           Injury, potential bleeding or infection: none    Lab Results   Component Value Date    WBC 11.1 05/20/2020     05/20/2020         BP (!) 146/96   Pulse 75   Temp 96.6 °F (35.9 °C) (Oral)   Resp 16   Wt 189 lb 2 oz (85.8 kg)   SpO2 98%   BMI 31.47 kg/m²   Patient Currently in Pain: No               Assessment/Plan: Patient was seen today for weekly visit.       Chirag Rolle

## 2020-10-16 ENCOUNTER — HOSPITAL ENCOUNTER (OUTPATIENT)
Age: 60
Setting detail: SPECIMEN
Discharge: HOME | End: 2020-10-16
Payer: COMMERCIAL

## 2020-10-16 LAB — PROSTATE SPECIFIC ANTIGEN: <0.01 UG/L

## 2020-10-20 ENCOUNTER — OFFICE VISIT (OUTPATIENT)
Dept: INTERNAL MEDICINE CLINIC | Age: 60
End: 2020-10-20
Payer: COMMERCIAL

## 2020-10-20 VITALS
HEIGHT: 65 IN | SYSTOLIC BLOOD PRESSURE: 130 MMHG | BODY MASS INDEX: 31.82 KG/M2 | HEART RATE: 72 BPM | TEMPERATURE: 97.7 F | DIASTOLIC BLOOD PRESSURE: 86 MMHG | OXYGEN SATURATION: 97 % | WEIGHT: 191 LBS

## 2020-10-20 PROCEDURE — G8417 CALC BMI ABV UP PARAM F/U: HCPCS | Performed by: INTERNAL MEDICINE

## 2020-10-20 PROCEDURE — 99214 OFFICE O/P EST MOD 30 MIN: CPT | Performed by: INTERNAL MEDICINE

## 2020-10-20 PROCEDURE — 1036F TOBACCO NON-USER: CPT | Performed by: INTERNAL MEDICINE

## 2020-10-20 PROCEDURE — G8484 FLU IMMUNIZE NO ADMIN: HCPCS | Performed by: INTERNAL MEDICINE

## 2020-10-20 PROCEDURE — 3017F COLORECTAL CA SCREEN DOC REV: CPT | Performed by: INTERNAL MEDICINE

## 2020-10-20 PROCEDURE — G8427 DOCREV CUR MEDS BY ELIG CLIN: HCPCS | Performed by: INTERNAL MEDICINE

## 2020-10-20 RX ORDER — CEFUROXIME AXETIL 500 MG/1
500 TABLET ORAL 2 TIMES DAILY
Qty: 14 TABLET | Refills: 0 | Status: SHIPPED | OUTPATIENT
Start: 2020-10-20 | End: 2020-10-27

## 2020-10-20 RX ORDER — BENZONATATE 100 MG/1
100 CAPSULE ORAL 2 TIMES DAILY PRN
Qty: 20 CAPSULE | Refills: 0 | Status: SHIPPED | OUTPATIENT
Start: 2020-10-20 | End: 2020-10-27

## 2020-10-20 ASSESSMENT — ENCOUNTER SYMPTOMS
COUGH: 1
ALLERGIC/IMMUNOLOGIC NEGATIVE: 1
SINUS PRESSURE: 0
EYES NEGATIVE: 1
SORE THROAT: 1
GASTROINTESTINAL NEGATIVE: 1
SINUS PAIN: 0

## 2020-10-20 NOTE — PROGRESS NOTES
Subjective:      Patient ID: Kamryn Wiseman is a 64 y.o. male. URI    This is a new problem. The current episode started in the past 7 days. The problem has been gradually worsening. There has been no fever. Associated symptoms include congestion, coughing and a sore throat. Pertinent negatives include no sinus pain. He has tried nothing for the symptoms. Review of Systems   Constitutional: Negative. HENT: Positive for congestion and sore throat. Negative for sinus pressure and sinus pain. Eyes: Negative. Respiratory: Positive for cough. Cardiovascular: Negative. Gastrointestinal: Negative. Endocrine: Negative. Genitourinary: Negative. Has been treated for carcinoma prostate with surgery and radiation the tumor was classified as class III with no evidence of libia or distant mets   Musculoskeletal: Negative. Allergic/Immunologic: Negative. Neurological: Negative. Hematological: Negative. Psychiatric/Behavioral: Negative. Objective:   Physical Exam  Constitutional:       Appearance: Normal appearance. HENT:      Head: Normocephalic and atraumatic. Right Ear: Tympanic membrane, ear canal and external ear normal.      Left Ear: Tympanic membrane, ear canal and external ear normal. There is no impacted cerumen. Nose: No congestion. Mouth/Throat:      Mouth: Mucous membranes are dry. Pharynx: Posterior oropharyngeal erythema present. Comments: His uvula is enlarged and inflamed  Eyes:      Extraocular Movements: Extraocular movements intact. Pupils: Pupils are equal, round, and reactive to light. Neck:      Musculoskeletal: Normal range of motion. Cardiovascular:      Rate and Rhythm: Normal rate and regular rhythm. Heart sounds: No murmur. Pulmonary:      Breath sounds: No wheezing or rales. Abdominal:      General: Abdomen is flat. Palpations: Abdomen is soft. Tenderness: There is no abdominal tenderness. Genitourinary:     Penis: Normal.       Scrotum/Testes: Normal.      Prostate: Normal.      Rectum: Normal.      Comments: BPH 2 + with normal consistency  Musculoskeletal: Normal range of motion. Skin:     General: Skin is warm and dry. Neurological:      General: No focal deficit present. Mental Status: He is alert and oriented to person, place, and time. Psychiatric:         Mood and Affect: Mood normal.         Behavior: Behavior normal.         Thought Content: Thought content normal.         Judgment: Judgment normal.         Assessment / Plan:       Diagnosis Orders   1. Essential hypertension -his blood pressure is well controlled today    2. Acute bronchitis due to Haemophilus influenzae = even Ceftin and Tessalon Perles. Patient on my examination he does have pharyngeal erythema and swollen uvula    3. Gastroesophageal reflux disease without esophagitis = symptoms of reflux today    4. Prostate cancer [facility]) = he has been treated for prostate cancer with a laparoscopic procedure in which there was no complete removal of prostate, by radiation and his was staged to like 3C is local spread with no node or metastatic involvement      Return in about 6 months (around 4/20/2021) for Follow Up, hypertension. No orders of the defined types were placed in this encounter. Orders Placed This Encounter   Medications    cefUROXime (CEFTIN) 500 MG tablet     Sig: Take 1 tablet by mouth 2 times daily for 7 days     Dispense:  14 tablet     Refill:  0    benzonatate (TESSALON) 100 MG capsule     Sig: Take 1 capsule by mouth 2 times daily as needed for Cough     Dispense:  20 capsule     Refill:  0     Visit Information    Have you changed or started any medications since your last visit including any over-the-counter medicines, vitamins, or herbal medicines? no   Are you having any side effects from any of your medications? -  no  Have you stopped taking any of your medications?  Is so, why? - no    Have you seen any other physician or provider since your last visit? yes  Have you had any other diagnostic tests since your last visit? yes  Have you been seen in the emergency room and/or had an admission to a hospital since we last saw you? No  Have you had your routine dental cleaning in the past 6 months? no    Have you activated your [employer] account? If not, what are your barriers?  Yes     Patient Care Team:  Cristobal Goodman MD as PCP - Niki Poole MD as PCP - [facility]  Diego Berrios MD as Consulting Physician (Gastroenterology)  Sari Beltran MD as Consulting Physician (Radiation Oncology)  Rivera Ponce MD as Consulting Physician (Urology)    Medical History Review  Past Medical, Family, and Social History reviewed and does not contribute to the patient presenting condition    Health Maintenance   Topic Date Due    HIV screen  08/15/1977    DTaP/Tdap/Td vaccine (1 - Tdap) 08/15/1981    Flu vaccine (1) 09/01/2020    Shingles Vaccine (1 of 2) 11/12/2020 (Originally 8/15/2012)    Potassium monitoring  05/20/2021    Creatinine monitoring  05/20/2021    Diabetes screen  06/29/2021    PSA counseling  10/16/2021    Lipid screen  03/08/2023    Colon cancer screen colonoscopy  12/04/2024    Hepatitis C screen  Completed    Hepatitis A vaccine  Aged Out    Hepatitis B vaccine  Aged Out    Hib vaccine  Aged Out    Meningococcal (ACWY) vaccine  Aged Out    Pneumococcal 0-64 years Vaccine  Aged Out

## 2020-10-27 ENCOUNTER — TELEPHONE (OUTPATIENT)
Dept: INTERNAL MEDICINE CLINIC | Age: 60
End: 2020-10-27

## 2020-10-27 RX ORDER — BENZONATATE 200 MG/1
200 CAPSULE ORAL 3 TIMES DAILY PRN
Qty: 30 CAPSULE | Refills: 0 | Status: SHIPPED | OUTPATIENT
Start: 2020-10-27 | End: 2020-11-06

## 2020-11-05 ENCOUNTER — TELEPHONE (OUTPATIENT)
Dept: RADIATION ONCOLOGY | Facility: MEDICAL CENTER | Age: 60
End: 2020-11-05

## 2020-11-13 ENCOUNTER — OFFICE VISIT (OUTPATIENT)
Dept: PRIMARY CARE CLINIC | Age: 60
End: 2020-11-13
Payer: COMMERCIAL

## 2020-11-13 ENCOUNTER — NURSE TRIAGE (OUTPATIENT)
Dept: OTHER | Facility: CLINIC | Age: 60
End: 2020-11-13

## 2020-11-13 ENCOUNTER — HOSPITAL ENCOUNTER (OUTPATIENT)
Age: 60
Setting detail: SPECIMEN
Discharge: HOME | End: 2020-11-13
Payer: COMMERCIAL

## 2020-11-13 VITALS
SYSTOLIC BLOOD PRESSURE: 133 MMHG | OXYGEN SATURATION: 97 % | BODY MASS INDEX: 29.99 KG/M2 | HEART RATE: 67 BPM | RESPIRATION RATE: 18 BRPM | DIASTOLIC BLOOD PRESSURE: 85 MMHG | WEIGHT: 180 LBS | HEIGHT: 65 IN | TEMPERATURE: 97.2 F

## 2020-11-13 PROCEDURE — 99214 OFFICE O/P EST MOD 30 MIN: CPT | Performed by: NURSE PRACTITIONER

## 2020-11-13 RX ORDER — PREDNISONE 20 MG/1
20 TABLET ORAL DAILY
Qty: 5 TABLET | Refills: 0 | Status: SHIPPED | OUTPATIENT
Start: 2020-11-13 | End: 2020-11-18

## 2020-11-13 RX ORDER — AMOXICILLIN 875 MG/1
875 TABLET, COATED ORAL 2 TIMES DAILY
Qty: 20 TABLET | Refills: 0 | Status: SHIPPED | OUTPATIENT
Start: 2020-11-13 | End: 2020-11-23

## 2020-11-13 RX ORDER — BENZONATATE 200 MG/1
200 CAPSULE ORAL 3 TIMES DAILY PRN
Qty: 30 CAPSULE | Refills: 0 | Status: SHIPPED | OUTPATIENT
Start: 2020-11-13 | End: 2020-11-23

## 2020-11-13 ASSESSMENT — ENCOUNTER SYMPTOMS
COUGH: 1
CHEST TIGHTNESS: 1
EYES NEGATIVE: 1
GASTROINTESTINAL NEGATIVE: 1
RHINORRHEA: 1

## 2020-11-13 NOTE — TELEPHONE ENCOUNTER
Reason for Disposition   Earache    Answer Assessment - Initial Assessment Questions  1. LOCATION: \"Where does it hurt? \"       Sinus pressure, headache    2. ONSET: \"When did the sinus pain start? \"  (e.g., hours, days)       Yesterday    3. SEVERITY: \"How bad is the pain? \"   (Scale 1-10; mild, moderate or severe)    - MILD (1-3): doesn't interfere with normal activities     - MODERATE (4-7): interferes with normal activities (e.g., work or school) or awakens from sleep    - SEVERE (8-10): excruciating pain and patient unable to do any normal activities         3/10    4. RECURRENT SYMPTOM: \"Have you ever had sinus problems before? \" If so, ask: \"When was the last time? \" and \"What happened that time? \"       Hx of sinus issues    5. NASAL CONGESTION: \"Is the nose blocked? \" If so, ask, \"Can you open it or must you breathe through the mouth? \"      Denies    6. NASAL DISCHARGE: \"Do you have discharge from your nose? \" If so ask, \"What color? \"      Clear    7. FEVER: \"Do you have a fever? \" If so, ask: \"What is it, how was it measured, and when did it start? \"       No fever    8. OTHER SYMPTOMS: \"Do you have any other symptoms? \" (e.g., sore throat, cough, earache, difficulty breathing)      Cough, nasal congestion, headache, sore throat    9. PREGNANCY: \"Is there any chance you are pregnant? \" \"When was your last menstrual period? \"      N/A    Protocols used: SINUS PAIN OR CONGESTION-ADULT-OH    Pt reports having cough, runny nose, headache, nasal congestion, left ear pain since yesterday. Reviewed for pt to have appt with PCP today. Warm transfer to [facility]. Caller provided care advice and instructed to call back with worsening symptoms. Attention Provider: Thank you for allowing me to participate in the care of your patient. The patient was connected to triage in response to information provided to the [facility].   Please do not respond through this encounter as the response is not directed to a shared pool.

## 2020-11-13 NOTE — PROGRESS NOTES
Pt is here for drainage and cough. Sx started yesterday. Pt was tested Monday for COVID-19 at work. [facility]  [facility]  [address], [address] [facility] [address]  Dept: [phone]  Dept Fax: [phone]    Bharat Blanco is a 64 y.o. male who presents to the urgent care today for his medical conditions/complaints as noted below. Bharat Blanco is c/o of Cough and Nasal Congestion      HPI:     HPI    Past Medical History:   Diagnosis Date    Anxiety state, unspecified     Central sleep apnea     uses SV machine, [facility]    Cholelithiasis     History of bronchitis 2017    History of rectal bleeding 2014    Hypoglycemia     Other and unspecified angina pectoris     2009-stress related    Prostate cancer ([facility] [address] 2020    Unspecified essential hypertension     Vision abnormalities        Current Outpatient Medications   Medication Sig Dispense Refill    benzonatate (TESSALON) 200 MG capsule Take 1 capsule by mouth 3 times daily as needed for Cough 30 capsule 0    predniSONE (DELTASONE) 20 MG tablet Take 1 tablet by mouth daily for 5 days 5 tablet 0    amoxicillin (AMOXIL) 875 MG tablet Take 1 tablet by mouth 2 times daily for 10 days 20 tablet 0    metoprolol tartrate (LOPRESSOR) 25 MG tablet Take 0.5 tablets by mouth 2 times daily (Patient taking differently: Take 12.5 mg by mouth daily ) 90 tablet 3    clonazePAM (KLONOPIN) 1 MG tablet Take 1 mg by mouth nightly. No current facility-administered medications for this visit. Allergies   Allergen Reactions    Azithromycin Nausea Only and Other (See Comments)     headache  Other reaction(s): Unknown    Seasonal      Sinus drainage       :     Review of Systems   Constitutional: Positive for chills and fatigue. HENT: Positive for congestion, postnasal drip and rhinorrhea. Eyes: Negative. Respiratory: Positive for cough and chest tightness.     Cardiovascular: Negative. Gastrointestinal: Negative. Musculoskeletal: Positive for myalgias. Skin: Negative. Allergic/Immunologic: Positive for environmental allergies. Neurological: Positive for headaches. All other systems reviewed and are negative.      :     Physical Exam  Vitals signs and nursing note reviewed. Constitutional:       Appearance: Normal appearance. HENT:      Right Ear: Tympanic membrane, ear canal and external ear normal.      Left Ear: Tympanic membrane, ear canal and external ear normal.      Nose: Congestion and rhinorrhea present. Mouth/Throat:      Mouth: Mucous membranes are moist.      Pharynx: Oropharynx is clear. Posterior oropharyngeal erythema present. No oropharyngeal exudate. Eyes:      Extraocular Movements: Extraocular movements intact. Conjunctiva/sclera: Conjunctivae normal.      Pupils: Pupils are equal, round, and reactive to light. Cardiovascular:      Rate and Rhythm: Normal rate and regular rhythm. Pulses: Normal pulses. Heart sounds: Normal heart sounds. Pulmonary:      Effort: Pulmonary effort is normal. No respiratory distress. Breath sounds: Normal breath sounds. No stridor. No wheezing or rhonchi. Abdominal:      General: Abdomen is flat. Bowel sounds are normal.      Palpations: Abdomen is soft. Tenderness: There is no abdominal tenderness. Musculoskeletal: Normal range of motion. Skin:     General: Skin is warm and dry. Capillary Refill: Capillary refill takes less than 2 seconds. Neurological:      Mental Status: He is alert. Psychiatric:         Mood and Affect: Mood normal.         Thought Content: Thought content normal.       /85 (Site: Right Upper Arm)   Pulse 67   Temp 97.2 °F (36.2 °C) (Temporal)   Resp 18   Ht 5' 5\" (1.651 m)   Wt 180 lb (81.6 kg)   SpO2 97%   BMI 29.95 kg/m²     :       Diagnosis Orders   1.  Acute non-recurrent frontal sinusitis  predniSONE (DELTASONE) 20 MG tablet amoxicillin (AMOXIL) 875 MG tablet   2. Suspected COVID-19 virus infection  Covid-19 Ambulatory   3. Cough  benzonatate (TESSALON) 200 MG capsule       :      Return if symptoms worsen or fail to improve. Orders Placed This Encounter   Medications    benzonatate (TESSALON) 200 MG capsule     Sig: Take 1 capsule by mouth 3 times daily as needed for Cough     Dispense:  30 capsule     Refill:  0    predniSONE (DELTASONE) 20 MG tablet     Sig: Take 1 tablet by mouth daily for 5 days     Dispense:  5 tablet     Refill:  0    amoxicillin (AMOXIL) 875 MG tablet     Sig: Take 1 tablet by mouth 2 times daily for 10 days     Dispense:  20 tablet     Refill:  0         Patient given educational materials - see patient instructions. Discussed use, benefit, and side effects of prescribed medications. All patient questions answered. Pt voiced understanding.     Electronically signed by MACEY Fish 11/13/2020 at 10:44 AM

## 2020-11-13 NOTE — PATIENT INSTRUCTIONS
responders and are asymptomatic (no fever,  cough, shortness of breath, or difficulty breathing) should self-quarantine for 14 days from the last  date of exposure to confirmed or suspected COVID-19. Your healthcare provider and public health staff will evaluate whether you can be cared for at home. If it is determined that you do not need to be hospitalized and can be isolated at home, you will be monitored by staff from your health department. You should follow the prevention steps below until a healthcare provider or local or state health department says you can return to your normal activities. Stay home except to get medical care  People who are mildly ill with COVID-19 are able to isolate at home during their illness. You should restrict activities outside your home, except for getting medical care. Do not go to work, school, or public areas. Avoid using public transportation, ride-sharing, or taxis. Separate yourself from other people and animals in your home  People: As much as possible, you should stay in a specific room and away from other people in your home. Also, you should use a separate bathroom, if available. Animals: You should restrict contact with pets and other animals while you are sick with COVID-19, just like you would around other people. Although there have not been reports of pets or other animals becoming sick with COVID-19, it is still recommended that people sick with COVID-19 limit contact with animals until more information is known about the virus. When possible, have another member of your household care for your animals while you are sick. If you are sick with COVID-19, avoid contact with your pet, including petting, snuggling, being kissed or licked, and sharing food. If you must care for your pet or be around animals while you are sick, wash your hands before and after you interact with pets and wear a facemask.   Call ahead before visiting your doctor  If you have a medical appointment, call the healthcare provider and tell them that you have or may have COVID-19. This will help the healthcare providers office take steps to keep other people from getting infected or exposed. Wear a facemask  You should wear a facemask when you are around other people (e.g., sharing a room or vehicle) or pets and before you enter a healthcare providers office. If you are not able to wear a facemask (for example, because it causes trouble breathing), then people who live with you should not stay in the same room with you; they should also wear a facemask if they enter your room. Cover your coughs and sneezes  Cover your mouth and nose with a tissue when you cough or sneeze. Throw used tissues in a lined trash can. Immediately wash your hands with soap and water for at least 20 seconds or, if soap and water are not available, clean your hands with an alcohol-based hand  that contains at least 60% alcohol. Clean your hands often  Wash your hands often with soap and water for at least 20 seconds, especially after blowing your nose, coughing, or sneezing; going to the bathroom; and before eating or preparing food. If soap and water are not readily available, use an alcohol-based hand  with at least 60% alcohol, covering all surfaces of your hands and rubbing them together until they feel dry. Soap and water are the best option if hands are visibly dirty. Avoid touching your eyes, nose, and mouth with unwashed hands. Avoid sharing personal household items  You should not share dishes, drinking glasses, cups, eating utensils, towels, or bedding with other people or pets in your home. After using these items, they should be washed thoroughly with soap and water. Clean all high-touch surfaces everyday  High touch surfaces include counters, tabletops, doorknobs, bathroom fixtures, toilets, phones, keyboards, tablets, and bedside tables.  Also, clean any surfaces that may have blood,

## 2020-11-17 LAB — SARS-COV-2, NAA: NOT DETECTED

## 2020-12-01 ENCOUNTER — TELEMEDICINE (OUTPATIENT)
Dept: INTERNAL MEDICINE CLINIC | Age: 60
End: 2020-12-01
Payer: COMMERCIAL

## 2020-12-01 PROCEDURE — 3017F COLORECTAL CA SCREEN DOC REV: CPT | Performed by: NURSE PRACTITIONER

## 2020-12-01 PROCEDURE — G8427 DOCREV CUR MEDS BY ELIG CLIN: HCPCS | Performed by: NURSE PRACTITIONER

## 2020-12-01 PROCEDURE — 99213 OFFICE O/P EST LOW 20 MIN: CPT | Performed by: NURSE PRACTITIONER

## 2020-12-01 RX ORDER — METHYLPREDNISOLONE 4 MG/1
TABLET ORAL
Qty: 1 KIT | Refills: 0 | Status: SHIPPED | OUTPATIENT
Start: 2020-12-01 | End: 2020-12-07

## 2020-12-01 RX ORDER — FEXOFENADINE HCL 180 MG/1
180 TABLET ORAL DAILY
Qty: 30 TABLET | Refills: 1 | Status: SHIPPED | OUTPATIENT
Start: 2020-12-01 | End: 2021-01-26 | Stop reason: SDUPTHER

## 2020-12-01 ASSESSMENT — ENCOUNTER SYMPTOMS
NAUSEA: 0
SINUS PRESSURE: 0
SORE THROAT: 1
WHEEZING: 0
EYE DISCHARGE: 0
SHORTNESS OF BREATH: 0
DIARRHEA: 0
COUGH: 1
TROUBLE SWALLOWING: 0
CHEST TIGHTNESS: 0
EYE ITCHING: 0
VOMITING: 0
VOICE CHANGE: 1

## 2020-12-01 NOTE — PATIENT INSTRUCTIONS
Patient Education        Sore Throat: Care Instructions  Your Care Instructions     Infection by bacteria or a virus causes most sore throats. Cigarette smoke, dry air, air pollution, allergies, and yelling can also cause a sore throat. Sore throats can be painful and annoying. Fortunately, most sore throats go away on their own. If you have a bacterial infection, your doctor may prescribe antibiotics. Follow-up care is a key part of your treatment and safety. Be sure to make and go to all appointments, and call your doctor if you are having problems. It's also a good idea to know your test results and keep a list of the medicines you take. How can you care for yourself at home? · If your doctor prescribed antibiotics, take them as directed. Do not stop taking them just because you feel better. You need to take the full course of antibiotics. · Gargle with warm salt water once an hour to help reduce swelling and relieve discomfort. Use 1 teaspoon of salt mixed in 1 cup of warm water. · Take an over-the-counter pain medicine, such as acetaminophen (Tylenol), ibuprofen (Advil, Motrin), or naproxen (Aleve). Read and follow all instructions on the label. · Be careful when taking over-the-counter cold or flu medicines and Tylenol at the same time. Many of these medicines have acetaminophen, which is Tylenol. Read the labels to make sure that you are not taking more than the recommended dose. Too much acetaminophen (Tylenol) can be harmful. · Drink plenty of fluids. Fluids may help soothe an irritated throat. Hot fluids, such as tea or soup, may help decrease throat pain. · Use over-the-counter throat lozenges to soothe pain. Regular cough drops or hard candy may also help. These should not be given to young children because of the risk of choking. · Do not smoke or allow others to smoke around you. If you need help quitting, talk to your doctor about stop-smoking programs and medicines.  These can increase your chances of quitting for good. · Use a vaporizer or humidifier to add moisture to your bedroom. Follow the directions for cleaning the machine. When should you call for help? Call your doctor now or seek immediate medical care if:    · You have new or worse trouble swallowing.     · Your sore throat gets much worse on one side. Watch closely for changes in your health, and be sure to contact your doctor if you do not get better as expected. Where can you learn more? Go to [email or web address] and sign in to your [employer] account. Enter X903 in the Interlude box to learn more about \"Sore Throat: Care Instructions. \"     If you do not have an account, please click on the \"Sign Up Now\" link. Current as of: April 15, 2020               Content Version: 12.6  © 1741-3413 [employer]. Care instructions adapted under license by [facility] ([facility]). If you have questions about a medical condition or this instruction, always ask your healthcare professional. [place] [address] any warranty or liability for your use of this information.

## 2020-12-01 NOTE — PROGRESS NOTES
Visit Information    Have you changed or started any medications since your last visit including any over-the-counter medicines, vitamins, or herbal medicines? no   Are you having any side effects from any of your medications? -  no  Have you stopped taking any of your medications? Is so, why? -  no    Have you seen any other physician or provider since your last visit? Yes - Records Obtained  Have you had any other diagnostic tests since your last visit? Yes - Records Obtained  Have you been seen in the emergency room and/or had an admission to a hospital since we last saw you? No  Have you had your routine dental cleaning in the past 6 months? no    Have you activated your [employer] account? If not, what are your barriers?  Yes     Patient Care Team:  Candelario Hernandez MD as PCP - Luis Officer, MD as PCP - [facility] MD Rudy as Consulting Physician (Gastroenterology)  Ida Diaz MD as Consulting Physician (Radiation Oncology)  Guilherme Blair MD as Consulting Physician (Urology)    Medical History Review  Past Medical, Family, and Social History reviewed and does contribute to the patient presenting condition    Health Maintenance   Topic Date Due    HIV screen  08/15/1977    DTaP/Tdap/Td vaccine (1 - Tdap) 08/15/1981    Shingles Vaccine (1 of 2) 08/15/2012    Flu vaccine (1) 2020    Potassium monitoring  2021    Creatinine monitoring  2021    Diabetes screen  2021    PSA counseling  10/16/2021    Lipid screen  2023    Colon cancer screen colonoscopy  2024    Hepatitis C screen  Completed    Hepatitis A vaccine  Aged Out    Hepatitis B vaccine  Aged Out    Hib vaccine  Aged Out    Meningococcal (ACWY) vaccine  Aged Out    Pneumococcal 0-64 years Vaccine  Aged Out     2020    TELEHEALTH EVALUATION -- Audio/Visual (During JSOFK-05 public health emergency)    HPI:    Naina Murphy (:  1960) has requested an audio/video evaluation for the following concern(s):    Recurrent URI symptoms, including sore throat, ear pain, head and nasal congestion. He is getting checked for Covid twice weekly at work, has been negative. He reports that ear pain has improved since he was been up for a few hours. He has been seen twice recently for similar symptoms, in the office on 10/20 when he was treated with [employer]man and Ceftin, as well as on 11/13 at urgent care, treated with Amoxil and steroid burst.  He apparently did not start taking the amoxicillin, but states he is now taking it. He states that he has had similar symptoms for years, generally in the spring and fall. He was on Allegra, which he states worked well for him but he stopped taking it because it was expensive OTC. He has tried Tylenol for the headache, which helps. He reports that in the past, he has been given a Medrol Dosepak which seemed to work well. Review of Systems   Constitutional: Negative for chills, fatigue and fever. HENT: Positive for congestion, ear pain, postnasal drip, sore throat and voice change. Negative for ear discharge, sinus pressure and trouble swallowing. Eyes: Negative for discharge and itching. Respiratory: Positive for cough. Negative for chest tightness, shortness of breath and wheezing. Cardiovascular: Negative for chest pain, palpitations and leg swelling. Gastrointestinal: Negative for diarrhea, nausea and vomiting. Neurological: Positive for headaches. Prior to Visit Medications    Medication Sig Taking? Authorizing Provider   fexofenadine (ALLEGRA) 180 MG tablet Take 1 tablet by mouth daily Yes MACEY Mo CNP   methylPREDNISolone (MEDROL DOSEPACK) 4 MG tablet Take by mouth as directed.  Yes MACEY Mo CNP   metoprolol tartrate (LOPRESSOR) 25 MG tablet Take 0.5 tablets by mouth 2 times daily  Patient taking differently: Take 12.5 mg by mouth daily  Yes Greg HOLLAND Cherylene Gip, MD   clonazePAM (KLONOPIN) 1 MG tablet Take 1 mg by mouth nightly. Yes Historical Provider, MD       Social History     Tobacco Use    Smoking status: Never Smoker    Smokeless tobacco: Never Used   Substance Use Topics    Alcohol use: Yes     Comment: social    Drug use: No        Allergies   Allergen Reactions    Azithromycin Nausea Only and Other (See Comments)     headache  Other reaction(s): Unknown    Seasonal      Sinus drainage   ,   Past Medical History:   Diagnosis Date    Anxiety state, unspecified     Central sleep apnea     uses SV machine, Dr. Jordan [facility]    Cholelithiasis     History of bronchitis 2017    History of rectal bleeding 2014    Hypoglycemia     Other and unspecified angina pectoris     2009-stress related    Prostate cancer ([facility] [address] 2020    Unspecified essential hypertension     Vision abnormalities    ,   Past Surgical History:   Procedure Laterality Date    CHOLECYSTECTOMY, LAPAROSCOPIC  10/2016    COLONOSCOPY  12/4/14    NORMAL-RANDOM BIOPSY    LIPOMA RESECTION Left APPROX. 2010    LEFT ELBOW    PROSTATE BIOPSY  03/17/2020    PROSTATECTOMY  05/19/2020    ROBOTIC LAPAROSCOPIC PROSTATECTOMY, PELVIC LYMPHNODE DISSECTION     PROSTATECTOMY N/A 5/19/2020    XI ROBOTIC LAPAROSCOPIC PROSTATECTOMY, PELVIC LYMPHNODE DISSECTION performed by Lo Pitts MD at [address] [place] [address]:  Patient was not physically examined as this is a virtual visit. Physical findings limited and were observed by video.       [ INSTRUCTIONS:  \"[x]\" Indicates a positive item  \"[]\" Indicates a negative item  -- DELETE ALL ITEMS NOT EXAMINED]  Vital Signs: (As obtained by patient/caregiver or practitioner observation)    Patient-Reported Vitals 12/1/2020   Patient-Reported Weight 180lb   Patient-Reported Height 5'5          Constitutional: [x] Appears well-developed and well-nourished [] No apparent distress      [] Abnormal-   Mental status  [x] Alert and awake  [] Oriented to person/place/time [x]Able to follow commands      Eyes:  EOM    []  Normal  [] Abnormal-  Sclera  [x]  Normal  [] Abnormal -         Discharge [x]  None visible  [] Abnormal -    HENT:   [x] Normocephalic, atraumatic. [] Abnormal   [] Mouth/Throat: Mucous membranes are moist. Frontal and maxillary sinuses tender to self-palpation    External Ears [x] Normal  [] Abnormal- no pain when outer ear is touched/pulled    Neck: [x] No visualized mass     Pulmonary/Chest: [x] Respiratory effort normal.  [x] No visualized signs of difficulty breathing or respiratory distress        [] Abnormal-      Musculoskeletal:   [] Normal gait with no signs of ataxia         [x] Normal range of motion of neck        [] Abnormal-       Neurological:        [x] No Facial Asymmetry (Cranial nerve 7 motor function) (limited exam to video visit)          [] No gaze palsy        [] Abnormal-         Skin:        [x] No significant exanthematous lesions or discoloration noted on facial skin         [] Abnormal-            Psychiatric:       [x] Normal Affect [] No Hallucinations        [] Abnormal-     Other pertinent observable physical exam findings-     ASSESSMENT/PLAN:  Visit Diagnoses and Associated Orders     Recurrent sinusitis    -  Primary    Patient is taking Amoxil from urgent care, continue symptomatic measures, rest, fluids. Add Medrol dosepak    methylPREDNISolone (MEDROL DOSEPACK) 4 MG tablet [[id number]]           Pharyngitis, unspecified etiology        Supportive measures, including warm salt water gargles, plenty of fluids. Otalgia, bilateral        Patient to use Tylenol or Advil as needed         Seasonal allergies        Recurrent, add Allegra     fexofenadine (ALLEGRA) 180 MG tablet [[id number]]                 Return if symptoms worsen or fail to improve. Rory Perez is a 64 y.o. male being evaluated by a Virtual Visit (video visit) encounter to address concerns as mentioned above.   A caregiver was present when appropriate. Due to this being a TeleHealth encounter (During EDCMV-11 public health emergency), evaluation of the following organ systems was limited: Vitals/Constitutional/EENT/Resp/CV/GI//MS/Neuro/Skin/Heme-Lymph-Imm. Pursuant to the emergency declaration under the [address] [place] [address], [address] [place] [address] and the [employer] Act, this Virtual Visit was conducted with patient's (and/or legal guardian's) consent, to reduce the patient's risk of exposure to COVID-19 and provide necessary medical care. The patient (and/or legal guardian) has also been advised to contact this office for worsening conditions or problems, and seek emergency medical treatment and/or call 911 if deemed necessary. Patient identification was verified at the start of the visit: Yes    Total time spent on this encounter: Not billed by time    Services were provided through a video synchronous discussion virtually to substitute for in-person clinic visit. Patient and provider were located at their individual homes. --MACEY Vargas CNP on 12/1/2020 at 6:02 PM    An electronic signature was used to authenticate this note. negative...

## 2020-12-07 ENCOUNTER — TELEPHONE (OUTPATIENT)
Dept: INTERNAL MEDICINE CLINIC | Age: 60
End: 2020-12-07

## 2020-12-07 RX ORDER — LEVOFLOXACIN 500 MG/1
500 TABLET, FILM COATED ORAL DAILY
Qty: 10 TABLET | Refills: 0 | Status: SHIPPED | OUTPATIENT
Start: 2020-12-07 | End: 2020-12-17

## 2020-12-07 RX ORDER — LEVOFLOXACIN 500 MG/1
500 TABLET, FILM COATED ORAL DAILY
Qty: 10 TABLET | Refills: 0 | Status: SHIPPED | OUTPATIENT
Start: 2020-12-07 | End: 2020-12-07 | Stop reason: SDUPTHER

## 2020-12-07 NOTE — TELEPHONE ENCOUNTER
Patient called regarding his recent appointments with Dr. Sivakumar Sampson and Madi Dennis. He completed the recent methylPREDNISolone (MEDROL DOSEPACK) 4 MG tablet. He is still taking the Allegra as prescribed. Symptoms are persistent, not worsening. Pt states he has a productive cough. Patient is concerned and would like to hear from his provider. Covid-19 test was negative.

## 2020-12-11 ENCOUNTER — TELEPHONE (OUTPATIENT)
Dept: INTERNAL MEDICINE CLINIC | Age: 60
End: 2020-12-11

## 2020-12-11 ENCOUNTER — HOSPITAL ENCOUNTER (OUTPATIENT)
Age: 60
Discharge: HOME | End: 2020-12-13
Payer: COMMERCIAL

## 2020-12-11 ENCOUNTER — HOSPITAL ENCOUNTER (OUTPATIENT)
Dept: GENERAL RADIOLOGY | Age: 60
Discharge: HOME | End: 2020-12-13
Payer: COMMERCIAL

## 2020-12-11 PROCEDURE — 71046 X-RAY EXAM CHEST 2 VIEWS: CPT

## 2020-12-11 NOTE — TELEPHONE ENCOUNTER
Pt called regarding his cough. He has been taking the antibiotic with no relief. He has noticed that carrying heavy objects will make his cough worse. He is requesting an X-ray of his chest.     Please advise.

## 2020-12-11 NOTE — TELEPHONE ENCOUNTER
Please go ahead and order chest x-ray PA and lateral and if his cough does not get better he will come and see me or make a virtual visit appointment. Cristobal

## 2021-01-14 ENCOUNTER — HOSPITAL ENCOUNTER (OUTPATIENT)
Dept: RADIATION ONCOLOGY | Facility: MEDICAL CENTER | Age: 61
Discharge: HOME | End: 2021-01-14
Attending: RADIOLOGY
Payer: COMMERCIAL

## 2021-01-14 VITALS
HEART RATE: 96 BPM | BODY MASS INDEX: 33.49 KG/M2 | WEIGHT: 201.25 LBS | DIASTOLIC BLOOD PRESSURE: 90 MMHG | RESPIRATION RATE: 16 BRPM | SYSTOLIC BLOOD PRESSURE: 142 MMHG | OXYGEN SATURATION: 95 % | TEMPERATURE: 97.3 F

## 2021-01-14 PROCEDURE — 99213 OFFICE O/P EST LOW 20 MIN: CPT | Performed by: RADIOLOGY

## 2021-01-14 PROCEDURE — 99211 OFF/OP EST MAY X REQ PHY/QHP: CPT | Performed by: RADIOLOGY

## 2021-01-14 NOTE — PROGRESS NOTES
Asael Manus  1/14/2021  3:24 PM    Pt here follow up prostate. PSA was less than 0.01 on 10/2020. Vitals:    01/14/21 1518   BP: (!) 142/90   Pulse: 96   Resp: 16   Temp: 97.3 °F (36.3 °C)   SpO2: 95%    :  Patient Currently in Pain: No             Wt Readings from Last 1 Encounters:   01/14/21 201 lb 4 oz (91.3 kg)                Current Outpatient Medications:     metoprolol tartrate (LOPRESSOR) 25 MG tablet, TAKE ONE-HALF (1/2) TABLET TWICE A DAY, Disp: 90 tablet, Rfl: 3    fexofenadine (ALLEGRA) 180 MG tablet, Take 1 tablet by mouth daily, Disp: 30 tablet, Rfl: 1    clonazePAM (KLONOPIN) 1 MG tablet, Take 1 mg by mouth nightly. , Disp: , Rfl:     Immunizations:    Influenza status:    [x]   Current   []   Patient declined    Pneumococcal status:  []   Current  [x]   Patient declined    Smoking Status:    [] Smoker - PPD:  Smoking cessation education: Provided []   Declined []    [] Nonsmoker - Quit Date:               [x] Never a smoker           Cancer Screening:  Colonoscopy [] Current [] Not current   [] Not current, but scheduled   [x] NA  Mammogram [] Current [] Not current   [] Not current, but scheduled   [x] NA  Prostate [x] Current [] Not current   [] Not current, but scheduled   [] NA  PAP/Pelvic [] Current [] Not current   [] Not current, but scheduled   [x] NA  Skin  [] Current  [] Not current   [] Not current, but scheduled   [x] NA    Hormone:  Lupron []   Last dose given:           Next dose due:   Eligard []   Last dose given:           Next dose due:   Aromatase Inhibitors []   Medication name:   N/A:  [x]              *BREAST Patient only:    Lymphedema Eval:   [] left arm      [] right arm  Location:     Measurement (cm)    Upper Bicep :    Lower Bicep :         FALLS RISK SCREEN  Instructions:  Assess the patient and enter the appropriate indicators that are present for fall risk identification.    Total the numbers entered and assign a fall risk score from Table 2.  Reassess patient at a minimum every 12 weeks or with status change. Assessment   Date  1/14/2021     1. Mental Ability: confusion/cognitively impaired 0     2. Elimination Issues: incontinence, frequency 0       3. Ambulatory: use of assistive devices (walker, cane, off-loading devices),        attached to equipment (IV pole, oxygen) 0     4. Sensory Limitations: dizziness, vertigo, impaired vision 0     5. Age less than 65        0     6. Age 72 or greater 0     7. Medication: diuretics, strong analgesics, hypnotics, sedatives,        antihypertensive agents 0   8. Falls:  recent history of falls within the last 3 months (not to include slipping or        tripping) 0   TOTAL 0    If score of 4 or greater was education given? No           TABLE 2   Risk Score Risk Level Plan of Care   0-3 Little or  No Risk 1. Provide assistance as indicated for ambulation activities  2. Reorient confused/cognitively impaired patient  3. Chair/bed in low position, stretcher/bed with siderails up except when performing patient care activities  5. Educate patient/family/caregiver on falls prevention  6.  Reassess in 12 weeks or with any noted change in patient condition which places them at a risk for a fall   4-6 Moderate Risk 1. Provide assistance as indicated for ambulation activities  2. Reorient confused/cognitively impaired patient  3. Chair/bed in low position, stretcher/bed with siderails up except when performing patient care activities  4. Educate patient/family/caregiver on falls prevention     7 or   Higher High Risk 1. Place patient in easily observable treatment room  2. Patient attended at all times by family member or staff  3. Provide assistance as indicated for ambulation activities  4. Reorient confused/cognitively impaired patient  5. Chair/bed in low position, stretcher/bed with siderails up except when performing patient care activities  6.   Educate patient/family/caregiver on falls prevention PLAN: Patient is seen today in follow up        Rose Hussein

## 2021-01-14 NOTE — PROGRESS NOTES
seen by his urologist Taj Grace MD as Consulting Physician (Urology)). I gave him 1 year follow-up appointment. I would like thank you very much for letting me participate in his care and I will keep you updated on him. Time spent with patient 25 minutes. >50% of time allotted to education, answering questions, and coordinating care.     Electronically signed by Evette Ronquillo MD on 1/14/2021 at 4:00 PM    Patient Care Team:  Jm Genao MD as PCP - Dominguez Hayes MD as PCP - [facility] Provider  Josey Vaca MD as Consulting Physician (Gastroenterology)  David Barajas MD as Consulting Physician (Radiation Oncology)  Taj Grace MD as Consulting Physician (Urology)

## 2021-01-18 ENCOUNTER — TELEPHONE (OUTPATIENT)
Dept: INTERNAL MEDICINE CLINIC | Age: 61
End: 2021-01-18

## 2021-01-19 DIAGNOSIS — F41.1 ANXIETY STATE: Primary | ICD-10-CM

## 2021-01-19 RX ORDER — TRAZODONE HYDROCHLORIDE 50 MG/1
50 TABLET ORAL NIGHTLY
Qty: 30 TABLET | Refills: 3 | Status: SHIPPED | OUTPATIENT
Start: 2021-01-19 | End: 2021-05-18 | Stop reason: SDUPTHER

## 2021-01-26 DIAGNOSIS — J30.2 SEASONAL ALLERGIES: ICD-10-CM

## 2021-01-26 RX ORDER — FEXOFENADINE HCL 180 MG/1
180 TABLET ORAL DAILY
Qty: 30 TABLET | Refills: 1 | Status: SHIPPED | OUTPATIENT
Start: 2021-01-26 | End: 2021-03-26 | Stop reason: SDUPTHER

## 2021-01-26 NOTE — TELEPHONE ENCOUNTER
Medication: fexofenadine (ALLEGRA) 180 MG tablet   Last visit: 12/01/20  Next visit: 4/20/2021  Last refill: 12/01/20  [facility]: [facility]

## 2021-03-26 DIAGNOSIS — J30.2 SEASONAL ALLERGIES: ICD-10-CM

## 2021-03-26 NOTE — TELEPHONE ENCOUNTER
Medication: Allegra   Last visit: 12/01/2020  Next visit: 4/20/2021  Last refill: 1/26/2021  [facility]
No

## 2021-03-29 RX ORDER — FEXOFENADINE HCL 180 MG/1
180 TABLET ORAL DAILY
Qty: 30 TABLET | Refills: 2 | Status: SHIPPED | OUTPATIENT
Start: 2021-03-29 | End: 2021-06-29 | Stop reason: SDUPTHER

## 2021-04-01 ENCOUNTER — HOSPITAL ENCOUNTER (OUTPATIENT)
Age: 61
Setting detail: SPECIMEN
Discharge: HOME | End: 2021-04-01
Payer: COMMERCIAL

## 2021-04-02 LAB — PROSTATE SPECIFIC ANTIGEN: <0.01 UG/L

## 2021-04-15 ENCOUNTER — TELEPHONE (OUTPATIENT)
Dept: INTERNAL MEDICINE CLINIC | Age: 61
End: 2021-04-15

## 2021-04-15 NOTE — TELEPHONE ENCOUNTER
Patient wants to make PCP aware of latest PSA result.   Does not need to be discussed just an [place]

## 2021-04-16 ENCOUNTER — NURSE TRIAGE (OUTPATIENT)
Dept: OTHER | Facility: CLINIC | Age: 61
End: 2021-04-16

## 2021-04-16 ENCOUNTER — TELEPHONE (OUTPATIENT)
Dept: INTERNAL MEDICINE CLINIC | Age: 61
End: 2021-04-16

## 2021-04-16 DIAGNOSIS — J06.9 UPPER RESPIRATORY TRACT INFECTION, UNSPECIFIED TYPE: Primary | ICD-10-CM

## 2021-04-16 NOTE — TELEPHONE ENCOUNTER
Received call from Román Talley at pre-service center [facility] with [employer] Complaint. Brief description of triage: Complaint of seasonal URI    Triage indicates for patient to see Virtual visit today     Care advice provided, patient verbalizes understanding; denies any other questions or concerns; instructed to call back for any new or worsening symptoms. Writer provided warm transfer to [place] at [facility] for appointment scheduling. Attention Provider: Thank you for allowing me to participate in the care of your patient. The patient was connected to triage in response to information provided to the [facility]. Please do not respond through this encounter as the response is not directed to a shared pool. Reason for Disposition   Earache is present    Answer Assessment - Initial Assessment Questions  1. ONSET: \"When did the cough begin? \"       Yesterday    2. SEVERITY: \"How bad is the cough today? \"       Occasional, mild, but more pressure in ears and pressure around the eyes. Headache is improving since I started my day. 3. RESPIRATORY DISTRESS: \"Describe your breathing. \"       Normal    4. FEVER: \"Do you have a fever? \" If so, ask: \"What is your temperature, how was it measured, and when did it start? \"      Denies    5. HEMOPTYSIS: \"Are you coughing up any blood? \" If so ask: \"How much? \" (flecks, streaks, tablespoons, etc.)      Denies    6. TREATMENT: \"What have you done so far to treat the cough? \" (e.g., meds, fluids, humidifier)      Allegra    7. CARDIAC HISTORY: \"Do you have any history of heart disease? \" (e.g., heart attack, congestive heart failure)       Denies    8. LUNG HISTORY: \"Do you have any history of lung disease? \"  (e.g., pulmonary embolus, asthma, emphysema)      Denies    9. PE RISK FACTORS: \"Do you have a history of blood clots? \" (or: recent major surgery, recent prolonged travel, bedridden)      Denies    10.  OTHER SYMPTOMS: \"Do you have any other symptoms? (e.g., runny

## 2021-04-16 NOTE — TELEPHONE ENCOUNTER
Sick Call    Marta Ac   1960   [id number]   Alexa Pino MD   Allergies   Allergen Reactions    Azithromycin Nausea Only and Other (See Comments)     headache  Other reaction(s): Unknown    Seasonal      Sinus drainage        Last Visit: 12/01/2020  Reason for No Same Day Appt:  No appts available today    Complaint: ear pain, sinus pressure, headache around the eyes, sore throat and a little cough. Patient usually each year has allergies, already taking allegra would like to know what else to do ?       Pharmacy: [facility]

## 2021-04-17 RX ORDER — CEFUROXIME AXETIL 500 MG/1
500 TABLET ORAL 2 TIMES DAILY
Qty: 14 TABLET | Refills: 0 | Status: SHIPPED | OUTPATIENT
Start: 2021-04-17 | End: 2021-04-24

## 2021-05-18 DIAGNOSIS — F41.1 ANXIETY STATE: ICD-10-CM

## 2021-05-18 RX ORDER — TRAZODONE HYDROCHLORIDE 50 MG/1
50 TABLET ORAL NIGHTLY
Qty: 30 TABLET | Refills: 3 | Status: SHIPPED | OUTPATIENT
Start: 2021-05-18 | Stop reason: SDUPTHER

## 2021-06-21 ENCOUNTER — TELEPHONE (OUTPATIENT)
Dept: INTERNAL MEDICINE CLINIC | Age: 61
End: 2021-06-21

## 2021-06-21 DIAGNOSIS — K59.1 DIARRHEA, FUNCTIONAL: Primary | ICD-10-CM

## 2021-06-21 NOTE — TELEPHONE ENCOUNTER
Patient is concerned, he is having diarrhea multiple times a week. Patient is worried this could be related to cholecystectomy done in 2016. Patient states he is having painful abdominal cramps that radiate into his back. He states he has to be near a bathroom at all times.      Please advise

## 2021-06-22 RX ORDER — CHOLESTYRAMINE 4 G/9G
POWDER, FOR SUSPENSION ORAL
Qty: 60 PACKET | Refills: 5 | Status: SHIPPED | OUTPATIENT
Start: 2021-06-22

## 2021-06-29 DIAGNOSIS — J30.2 SEASONAL ALLERGIES: ICD-10-CM

## 2021-06-29 RX ORDER — FEXOFENADINE HCL 180 MG/1
180 TABLET ORAL DAILY
Qty: 30 TABLET | Refills: 2 | Status: SHIPPED | OUTPATIENT
Start: 2021-06-29 | End: 2021-10-28 | Stop reason: SDUPTHER

## 2021-06-29 NOTE — TELEPHONE ENCOUNTER
----- Message from Kathleen Barron MA sent at 6/29/2021  9:10 AM EDT -----  Subject: Refill Request    QUESTIONS  Name of Medication? fexofenadine (ALLEGRA) 180 MG tablet  Patient-reported dosage and instructions? 180mg once daily  How many days do you have left? 3  Preferred Pharmacy? [address] phone number (if available)? [phone]  ---------------------------------------------------------------------------  --------------  CALL BACK INFO  What is the best way for the office to contact you? OK to leave message on   voicemail  Preferred Call Back Phone Number?  [phone]

## 2021-07-01 ENCOUNTER — TELEPHONE (OUTPATIENT)
Dept: INTERNAL MEDICINE CLINIC | Age: 61
End: 2021-07-01

## 2021-07-01 NOTE — TELEPHONE ENCOUNTER
cholestyramine (QUESTRAN) 4 g packet [[id number]]     Order Details  Dose, Route, Frequency: As Directed   Dispense Quantity: 60 packet       Patient wanted to notify Dr Rianna Motley that when he uses a full packet it upsets his stomach so he is now  only using half of the packet and that is helping.

## 2021-07-12 ENCOUNTER — NURSE TRIAGE (OUTPATIENT)
Dept: OTHER | Facility: CLINIC | Age: 61
End: 2021-07-12

## 2021-07-12 ENCOUNTER — OFFICE VISIT (OUTPATIENT)
Dept: FAMILY MEDICINE CLINIC | Age: 61
End: 2021-07-12
Payer: COMMERCIAL

## 2021-07-12 VITALS
HEART RATE: 54 BPM | TEMPERATURE: 97.8 F | HEIGHT: 65 IN | SYSTOLIC BLOOD PRESSURE: 142 MMHG | OXYGEN SATURATION: 97 % | WEIGHT: 201 LBS | BODY MASS INDEX: 33.49 KG/M2 | DIASTOLIC BLOOD PRESSURE: 87 MMHG

## 2021-07-12 DIAGNOSIS — L23.7 POISON IVY DERMATITIS: Primary | ICD-10-CM

## 2021-07-12 PROCEDURE — 99213 OFFICE O/P EST LOW 20 MIN: CPT | Performed by: NURSE PRACTITIONER

## 2021-07-12 PROCEDURE — 1036F TOBACCO NON-USER: CPT | Performed by: NURSE PRACTITIONER

## 2021-07-12 PROCEDURE — G8417 CALC BMI ABV UP PARAM F/U: HCPCS | Performed by: NURSE PRACTITIONER

## 2021-07-12 PROCEDURE — 96372 THER/PROPH/DIAG INJ SC/IM: CPT | Performed by: NURSE PRACTITIONER

## 2021-07-12 PROCEDURE — 3017F COLORECTAL CA SCREEN DOC REV: CPT | Performed by: NURSE PRACTITIONER

## 2021-07-12 PROCEDURE — G8427 DOCREV CUR MEDS BY ELIG CLIN: HCPCS | Performed by: NURSE PRACTITIONER

## 2021-07-12 RX ORDER — TRIAMCINOLONE ACETONIDE 40 MG/ML
40 INJECTION, SUSPENSION INTRA-ARTICULAR; INTRAMUSCULAR ONCE
Status: COMPLETED | OUTPATIENT
Start: 2021-07-12 | End: 2021-07-12

## 2021-07-12 RX ORDER — OXYBUTYNIN CHLORIDE 5 MG/1
TABLET ORAL
COMMUNITY
Start: 2021-07-07

## 2021-07-12 RX ORDER — HYDROXYZINE HYDROCHLORIDE 25 MG/1
25 TABLET, FILM COATED ORAL EVERY 8 HOURS PRN
Qty: 30 TABLET | Refills: 0 | Status: SHIPPED | OUTPATIENT
Start: 2021-07-12 | End: 2021-07-22

## 2021-07-12 RX ADMIN — TRIAMCINOLONE ACETONIDE 40 MG: 40 INJECTION, SUSPENSION INTRA-ARTICULAR; INTRAMUSCULAR at 10:07

## 2021-07-12 ASSESSMENT — PATIENT HEALTH QUESTIONNAIRE - PHQ9
1. LITTLE INTEREST OR PLEASURE IN DOING THINGS: 0
2. FEELING DOWN, DEPRESSED OR HOPELESS: 0
SUM OF ALL RESPONSES TO PHQ QUESTIONS 1-9: 0
SUM OF ALL RESPONSES TO PHQ9 QUESTIONS 1 & 2: 0
DEPRESSION UNABLE TO ASSESS: FUNCTIONAL CAPACITY MOTIVATION LIMITS ACCURACY

## 2021-07-12 ASSESSMENT — ENCOUNTER SYMPTOMS
SHORTNESS OF BREATH: 0
SORE THROAT: 0

## 2021-07-12 NOTE — PATIENT INSTRUCTIONS
Return worse  Patient Education        Poison [place], [place], and [place]: Care Instructions  Your Care Instructions     Poison ivy, poison oak, and poison sumac are plants that can cause a skin rash upon contact. The red, itchy rash often shows up in lines or streaks and may cause fluid-filled blisters or large, raised hives. The rash is caused by an allergic reaction to an oil in poison ivy, oak, and sumac. The rash may occur when you touch the plant or when you touch clothing, pet fur, sporting gear, gardening tools, or other objects that have come in contact with one of these plants. You cannot catch or spread the rash, even if you touch it or the blister fluid, because the plant oil will already have been absorbed or washed off the skin. The rash may seem to be spreading, but either it is still developing from earlier contact or you have touched something that still has the plant oil on it. Follow-up care is a key part of your treatment and safety. Be sure to make and go to all appointments, and call your doctor if you are having problems. It's also a good idea to know your test results and keep a list of the medicines you take. How can you care for yourself at home? · If your doctor prescribed a cream, use it as directed. If your doctor prescribed medicine, take it exactly as prescribed. Call your doctor if you think you are having a problem with your medicine. · Use cold, wet cloths to reduce itching. · Keep cool, and stay out of the sun. · Leave the rash open to the air. · Wash all clothing or other things that may have come in contact with the plant oil. · Avoid most lotions and ointments until the rash heals. Calamine lotion may help relieve symptoms of a plant rash. Use it 3 or 4 times a day. To prevent poison ivy exposure  If you know that you will be near poison ivy, oak, or sumac, you can try these options:  · Use a product designed to help prevent plant oil from getting on the skin.  These products, such as Elinor X Pre-Contact Skin Solution, come in lotions, sprays, or towelettes. You put the product on your skin right before you go outdoors. · If you did not use a preventive product and you have had contact with plant oil, clean it off your skin as soon as possible. Use a product such as Tecnu Original Outdoor Skin Cleanser. These products can also be used to clean plant oil from clothing or tools. When should you call for help? Call your doctor now or seek immediate medical care if:    · Your rash gets worse, and you start to feel bad and have a fever, a stiff neck, nausea, and vomiting.     · You have signs of infection, such as:  ? Increased pain, swelling, warmth, or redness. ? Red streaks leading from the rash. ? Pus draining from the rash. ? A fever. Watch closely for changes in your health, and be sure to contact your doctor if:    · You have new blisters or bruises, or the rash spreads and looks like a sunburn.     · The rash gets worse, or it comes back after nearly disappearing.     · You think a medicine you are using is making your rash worse.     · Your rash does not clear up after 1 to 2 weeks of home treatment.     · You have joint aches or body aches with your rash. Where can you learn more? Go to [email or web address]. org and sign in to your [employer] account. Enter W067 in the [facility] box to learn more about \"Poison [place], [place], and [place]: Care Instructions. \"     If you do not have an account, please click on the \"Sign Up Now\" link. Current as of: March 3, 2021               Content Version: 12.9  © 8477-8857 [employer]. Care instructions adapted under license by [facility] ([facility]). If you have questions about a medical condition or this instruction, always ask your healthcare professional. [address] any warranty or liability for your use of this information.

## 2021-07-12 NOTE — PROGRESS NOTES
[id number] [facility]   [place] Marcelo Mcghee  Dept: [phone]  Dept Fax: [phone]    Bharat Blanco is a 64 y.o. male who presents to the urgent care today for his medical conditions/complaints as notedbelow. Bharat Blanco is c/o of Rash (onset since Friday, left side primarly of body but couple of right arm, using otc cream)      HPI:     64 yr old male presents for c/o poison ivy dermatitis. 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  Tried cortisone and benadryl with no relief. Rash  This is a new problem. The current episode started in the past 7 days (x3d). The problem has been gradually worsening since onset. The affected locations include the neck, torso, face, right arm, right hand, right fingers, left fingers, left hand and left arm. The rash is characterized by blistering and itchiness. He was exposed to plant contact. Pertinent negatives include no fatigue, fever, shortness of breath or sore throat. Past treatments include antihistamine and topical steroids. The treatment provided no relief.        Past Medical History:   Diagnosis Date    Anxiety state, unspecified     Central sleep apnea     uses SV [facility]    Cholelithiasis     History of bronchitis 2017    History of rectal bleeding 2014    Hypoglycemia     Other and unspecified angina pectoris     2009-stress related    Prostate cancer ([facility] [address] 2020    Unspecified essential hypertension     Vision abnormalities         Current Outpatient Medications   Medication Sig Dispense Refill    oxybutynin (DITROPAN) 5 MG tablet       triamcinolone (KENALOG) 0.1 % ointment Apply topically 2 times daily for 7 days 1 Tube 1    hydrOXYzine (ATARAX) 25 MG tablet Take 1 tablet by mouth every 8 hours as needed for Itching 30 tablet 0    fexofenadine (ALLEGRA) 180 MG tablet Take 1 tablet by mouth daily 30 tablet 2    cholestyramine (QUESTRAN) 4 g packet Take one dose 30 minutes before each meal. 60 packet 5    traZODone (DESYREL) 50 MG tablet Take 1 tablet by mouth nightly 30 tablet 3    metoprolol tartrate (LOPRESSOR) 25 MG tablet TAKE ONE-HALF (1/2) TABLET TWICE A DAY 90 tablet 3    clonazePAM (KLONOPIN) 1 MG tablet Take 1 mg by mouth nightly. Current Facility-Administered Medications   Medication Dose Route Frequency Provider Last Rate Last Admin    triamcinolone acetonide (KENALOG-40) injection 40 mg  40 mg Intramuscular Once Brentford Comfort, APRN - CNP         Allergies   Allergen Reactions    Azithromycin Nausea Only and Other (See Comments)     headache  Other reaction(s): Unknown    Seasonal      Sinus drainage       Subjective:      Review of Systems   Constitutional: Negative for fatigue and fever. HENT: Negative for sore throat. Respiratory: Negative for shortness of breath. Skin: Positive for rash. All other systems reviewed and are negative. 14 systems reviewed and negative except as listed in HPI. Objective:     Physical Exam  Vitals and nursing note reviewed. Constitutional:       General: He is not in acute distress. Appearance: Normal appearance. He is not ill-appearing, toxic-appearing or diaphoretic. HENT:      Head: Normocephalic and atraumatic. Right Ear: External ear normal.      Left Ear: External ear normal.      Mouth/Throat:      Mouth: Mucous membranes are moist.      Pharynx: Oropharynx is clear. No oropharyngeal exudate or posterior oropharyngeal erythema. Comments: No tongue elevation  Swallows without difficulty  Uvula midline no edema  No oropharyngeal swelling  Eyes:      General: No scleral icterus. Right eye: No discharge. Left eye: No discharge. Extraocular Movements: Extraocular movements intact. Conjunctiva/sclera: Conjunctivae normal.      Pupils: Pupils are equal, round, and reactive to light.    Cardiovascular:      Rate and Rhythm: Normal rate and regular rhythm. Pulses: Normal pulses. Heart sounds: Normal heart sounds. Pulmonary:      Effort: Pulmonary effort is normal. No respiratory distress. Breath sounds: Normal breath sounds. No stridor. No wheezing, rhonchi or rales. Musculoskeletal:         General: No signs of injury. Cervical back: Neck supple. Comments: Ambulated to and from room, gait is steady, moving all extremities without difficulty   Skin:     General: Skin is warm and dry. Capillary Refill: Capillary refill takes less than 2 seconds. Findings: Rash present. Comments: Pruritic maculopapular rash, some in linear fashion, to face, neck, donna hands, fingers, arms, torso, waist.  Some clusters clear fluid filled vesicles noted. Neurological:      General: No focal deficit present. Mental Status: He is alert and oriented to person, place, and time. Psychiatric:         Mood and Affect: Mood normal.         Behavior: Behavior normal.       BP (!) 142/87 (Site: Left Upper Arm, Position: Sitting, Cuff Size: Medium Adult)   Pulse 54   Temp 97.8 °F (36.6 °C) (Infrared)   Ht 5' 5\" (1.651 m)   Wt 201 lb (91.2 kg)   SpO2 97%   BMI 33.45 kg/m²     Assessment:       Diagnosis Orders   1. Poison ivy dermatitis         Plan:    kenalog IM  Kenalog ointment rx  Atarax rx for itching  Return if symptoms worsen or fail to improve, for Make an Appt. with your Primary Care in 1 week. Orders Placed This Encounter   Medications    triamcinolone acetonide (KENALOG-40) injection 40 mg    triamcinolone (KENALOG) 0.1 % ointment     Sig: Apply topically 2 times daily for 7 days     Dispense:  1 Tube     Refill:  1    hydrOXYzine (ATARAX) 25 MG tablet     Sig: Take 1 tablet by mouth every 8 hours as needed for Itching     Dispense:  30 tablet     Refill:  0         Patient given educational materials - see patient instructions.   Discussed use, benefit, and side effects of prescribed medications. All patient questions answered. Pt voicedunderstanding.     Electronically signed by MACEY Vides CNP on 7/12/2021 at 10:13 AM

## 2021-07-12 NOTE — TELEPHONE ENCOUNTER
Reason for Disposition   SEVERE itching interferes with normal activities (e.g., work or school) or prevents sleep    Answer Assessment - Initial Assessment Questions  1. APPEARANCE of RASH: \"Describe the rash. \"       Grouped together, raised and oily. States it is not red. Stated he got a shot last time he had this rash. 2. LOCATION: \"Where is the rash located? \"       Face- left temple, neck behind left ear, right and left arm, between fingers on left hand, left side to waist.     3. SIZE: \"How large is the rash? \"       Large    4. ONSET: \"When did the rash begin? \"       Friday     5. ITCHING: \"Does the rash itch? \" If so, ask: \"How bad is it? \"    - MILD - doesn't interfere with normal activities    - MODERATE-SEVERE: interferes with work, school, sleep, or other activities       8/10    6. PREGNANCY: \"Is there any chance you are pregnant? \" \"When was your last menstrual period? \"      N/a    Protocols used: POISON IVY - [place] - [place]-ADULT-[place]    Received call from [address] [place] [address] at [facility] with Red Flag Complaint. Brief description of triage: Widespread severely itchy rash, suspected poison ivy. Triage indicates for patient to be seen today in office, I [place] [facility] [place] get in. Care advice provided, patient verbalizes understanding; denies any other questions or concerns; instructed to call back for any new or worsening symptoms. Writer provided warm transfer to [address] [place] [address] at [facility] for appointment scheduling. Attention Provider: Thank you for allowing me to participate in the care of your patient. The patient was connected to triage in response to information provided to the [facility]. Please do not respond through this encounter as the response is not directed to a shared pool.

## 2021-07-15 ENCOUNTER — TELEPHONE (OUTPATIENT)
Dept: INTERNAL MEDICINE CLINIC | Age: 61
End: 2021-07-15

## 2021-07-15 DIAGNOSIS — L23.7 POISON IVY DERMATITIS: Primary | ICD-10-CM

## 2021-07-15 RX ORDER — PREDNISONE 20 MG/1
20 TABLET ORAL DAILY
Qty: 5 TABLET | Refills: 0 | Status: SHIPPED | OUTPATIENT
Start: 2021-07-15 | End: 2021-07-15 | Stop reason: CLARIF

## 2021-07-15 RX ORDER — PREDNISONE 20 MG/1
20 TABLET ORAL DAILY
Qty: 5 TABLET | Refills: 0 | Status: SHIPPED | OUTPATIENT
Start: 2021-07-15 | End: 2021-07-20 | Stop reason: ALTCHOICE

## 2021-07-15 NOTE — TELEPHONE ENCOUNTER
Dr Funez's pt. Pt called stating he went to [facility] on 7/12 for poison ivy. Pt received Kenolog IM, hydroxyzine and triamcinolone ointment. Pt states it is not getting better. Please advise.

## 2021-07-15 NOTE — TELEPHONE ENCOUNTER
Ordered prednisone for 5 days  Please advise patient if symptoms do not improve, he need to schedule appointment with the office.

## 2021-07-20 ENCOUNTER — TELEPHONE (OUTPATIENT)
Dept: INTERNAL MEDICINE CLINIC | Age: 61
End: 2021-07-20

## 2021-07-20 DIAGNOSIS — L23.7 POISON IVY DERMATITIS: Primary | ICD-10-CM

## 2021-07-20 RX ORDER — PREDNISONE 10 MG/1
TABLET ORAL
Qty: 10 TABLET | Refills: 0 | Status: SHIPPED | OUTPATIENT
Start: 2021-07-20 | End: 2021-07-21

## 2021-07-20 NOTE — TELEPHONE ENCOUNTER
Patient went to urgent care on 7/12/21 for poison ivy. Patient still has the rash and has woken up in the middle night of   the last two nights due to itching and to reapply creams.

## 2021-07-21 DIAGNOSIS — L23.7 POISON IVY DERMATITIS: ICD-10-CM

## 2021-07-21 RX ORDER — PREDNISONE 20 MG/1
TABLET ORAL
Qty: 10 TABLET | Refills: 0 | Status: SHIPPED | OUTPATIENT
Start: 2021-07-21

## 2021-09-10 DIAGNOSIS — F41.1 ANXIETY STATE: ICD-10-CM

## 2021-09-10 RX ORDER — TRAZODONE HYDROCHLORIDE 50 MG/1
TABLET ORAL
Qty: 30 TABLET | Refills: 3 | Status: SHIPPED | OUTPATIENT
Start: 2021-09-10 | End: 2021-10-28 | Stop reason: SDUPTHER

## 2021-10-28 DIAGNOSIS — J30.2 SEASONAL ALLERGIES: ICD-10-CM

## 2021-10-28 DIAGNOSIS — F41.1 ANXIETY STATE: ICD-10-CM

## 2021-10-28 RX ORDER — FEXOFENADINE HCL 180 MG/1
180 TABLET ORAL DAILY
Qty: 30 TABLET | Refills: 3 | Status: SHIPPED | OUTPATIENT
Start: 2021-10-28 | End: 2022-03-07

## 2021-10-28 RX ORDER — TRAZODONE HYDROCHLORIDE 50 MG/1
50 TABLET ORAL NIGHTLY
Qty: 30 TABLET | Refills: 3 | Status: SHIPPED | OUTPATIENT
Start: 2021-10-28 | End: 2022-02-09

## 2021-10-28 NOTE — TELEPHONE ENCOUNTER
Medication: Fexofenadine and Trazodone   Last visit: 12/01/2020  Next visit: Visit date not found  Last refill:   Pharm[facility]: [facility]

## 2021-11-22 ENCOUNTER — OFFICE VISIT (OUTPATIENT)
Dept: INTERNAL MEDICINE CLINIC | Age: 61
End: 2021-11-22
Payer: COMMERCIAL

## 2021-11-22 VITALS
HEART RATE: 84 BPM | SYSTOLIC BLOOD PRESSURE: 127 MMHG | OXYGEN SATURATION: 96 % | WEIGHT: 208 LBS | BODY MASS INDEX: 34.61 KG/M2 | DIASTOLIC BLOOD PRESSURE: 68 MMHG

## 2021-11-22 DIAGNOSIS — K21.9 GASTROESOPHAGEAL REFLUX DISEASE WITHOUT ESOPHAGITIS: ICD-10-CM

## 2021-11-22 DIAGNOSIS — Z13.1 ENCOUNTER FOR SCREENING FOR DIABETES MELLITUS: Primary | ICD-10-CM

## 2021-11-22 DIAGNOSIS — Z90.49 S/P LAPAROSCOPIC CHOLECYSTECTOMY: ICD-10-CM

## 2021-11-22 DIAGNOSIS — I10 ESSENTIAL HYPERTENSION: Chronic | ICD-10-CM

## 2021-11-22 DIAGNOSIS — J20.1 ACUTE BRONCHITIS DUE TO HAEMOPHILUS INFLUENZAE: ICD-10-CM

## 2021-11-22 DIAGNOSIS — G47.33 OBSTRUCTIVE SLEEP APNEA SYNDROME: Chronic | ICD-10-CM

## 2021-11-22 DIAGNOSIS — C61 PROSTATE CANCER (HCC): ICD-10-CM

## 2021-11-22 DIAGNOSIS — Z90.79 S/P PROSTATECTOMY: ICD-10-CM

## 2021-11-22 PROCEDURE — G8417 CALC BMI ABV UP PARAM F/U: HCPCS | Performed by: INTERNAL MEDICINE

## 2021-11-22 PROCEDURE — 3017F COLORECTAL CA SCREEN DOC REV: CPT | Performed by: INTERNAL MEDICINE

## 2021-11-22 PROCEDURE — 1036F TOBACCO NON-USER: CPT | Performed by: INTERNAL MEDICINE

## 2021-11-22 PROCEDURE — G8484 FLU IMMUNIZE NO ADMIN: HCPCS | Performed by: INTERNAL MEDICINE

## 2021-11-22 PROCEDURE — 99214 OFFICE O/P EST MOD 30 MIN: CPT | Performed by: INTERNAL MEDICINE

## 2021-11-22 PROCEDURE — G8427 DOCREV CUR MEDS BY ELIG CLIN: HCPCS | Performed by: INTERNAL MEDICINE

## 2021-11-22 RX ORDER — HYDROXYZINE HYDROCHLORIDE 25 MG/1
TABLET, FILM COATED ORAL
COMMUNITY
Start: 2021-07-12

## 2021-11-22 RX ORDER — DOXYCYCLINE HYCLATE 100 MG
100 TABLET ORAL 2 TIMES DAILY
Qty: 14 TABLET | Refills: 0 | Status: SHIPPED | OUTPATIENT
Start: 2021-11-22 | End: 2021-11-29 | Stop reason: SDUPTHER

## 2021-11-22 RX ORDER — DICYCLOMINE HYDROCHLORIDE 10 MG/1
CAPSULE ORAL
COMMUNITY
Start: 2021-11-17 | End: 2021-11-29 | Stop reason: SDUPTHER

## 2021-11-22 RX ORDER — PREDNISONE 20 MG/1
20 TABLET ORAL DAILY
Qty: 7 TABLET | Refills: 0 | Status: SHIPPED | OUTPATIENT
Start: 2021-11-22 | End: 2021-11-29

## 2021-11-22 ASSESSMENT — ENCOUNTER SYMPTOMS
EYE DISCHARGE: 0
VOICE CHANGE: 1
EYE ITCHING: 0
SHORTNESS OF BREATH: 0
COUGH: 1
TROUBLE SWALLOWING: 0
SORE THROAT: 1
WHEEZING: 0
VOMITING: 0
SINUS PRESSURE: 0
NAUSEA: 0
CHEST TIGHTNESS: 0
SINUS PAIN: 0
DIARRHEA: 0

## 2021-11-22 NOTE — PROGRESS NOTES
Subjective:      Patient ID: Mortimer Knee is a 63 y.o. male. Today he has recurrence of URI with wheeze and congestion    URI   This is a new problem. The current episode started in the past 7 days. The problem has been gradually worsening. There has been no fever. Associated symptoms include congestion, coughing, ear pain and a sore throat. Pertinent negatives include no chest pain, diarrhea, headaches, nausea, sinus pain, vomiting or wheezing. He has tried nothing for the symptoms. Review of Systems   Constitutional: Negative for chills, fatigue and fever. HENT: Positive for congestion, ear pain, postnasal drip, sore throat and voice change. Negative for ear discharge, sinus pressure, sinus pain and trouble swallowing. Eyes: Negative for discharge and itching. Respiratory: Positive for cough. Negative for chest tightness, shortness of breath and wheezing. Cardiovascular: Negative for chest pain, palpitations and leg swelling. Gastrointestinal: Negative for diarrhea, nausea and vomiting. Neurological: Negative for headaches. Objective:   Physical Exam  Constitutional:       Appearance: Normal appearance. HENT:      Head: Normocephalic and atraumatic. Right Ear: Tympanic membrane, ear canal and external ear normal.      Left Ear: Tympanic membrane, ear canal and external ear normal. There is no impacted cerumen. Nose: No congestion. Mouth/Throat:      Mouth: Mucous membranes are dry. Pharynx: No posterior oropharyngeal erythema. Comments: His uvula is enlarged and inflamed  Eyes:      Extraocular Movements: Extraocular movements intact. Pupils: Pupils are equal, round, and reactive to light. Cardiovascular:      Rate and Rhythm: Normal rate and regular rhythm. Heart sounds: No murmur heard. Pulmonary:      Breath sounds: No wheezing or rales. Abdominal:      General: Abdomen is flat. Palpations: Abdomen is soft.       Tenderness: There is no abdominal tenderness. Genitourinary:     Penis: Normal.       Testes: Normal.      Prostate: Normal.      Rectum: Normal.      Comments: S/p prostatectomy for CA prostate  Musculoskeletal:         General: Normal range of motion. Cervical back: Normal range of motion. Skin:     General: Skin is warm and dry. Neurological:      General: No focal deficit present. Mental Status: He is alert and oriented to person, place, and time. Psychiatric:         Mood and Affect: Mood normal.         Behavior: Behavior normal.         Thought Content: Thought content normal.         Judgment: Judgment normal.         Assessment / Plan:      Diagnosis Orders   1. Encounter for screening for diabetes mellitus     2. Essential hypertension     3. Obstructive sleep apnea syndrome     4. Acute bronchitis due to Haemophilus influenzae = will be treated with doxycycline and a short course of steroid, is a self administered COVID-19 test was negative    5. Gastroesophageal reflux disease without esophagitis     6. S/P laparoscopic cholecystectomy     7. Prostate cancer ([facility] [address]     8. S/P prostatectomy        Return in about 12 weeks (around 2/14/2022) for Follow Up, URI. Orders Placed This Encounter   Procedures    Glucose, Fasting     Standing Status:   Future     Standing Expiration Date:   11/22/2022     Orders Placed This Encounter   Medications    doxycycline hyclate (VIBRA-TABS) 100 MG tablet     Sig: Take 1 tablet by mouth 2 times daily for 7 days     Dispense:  14 tablet     Refill:  0    predniSONE (DELTASONE) 20 MG tablet     Sig: Take 1 tablet by mouth daily for 7 days     Dispense:  7 tablet     Refill:  0      Visit Information    Have you changed or started any medications since your last visit including any over-the-counter medicines, vitamins, or herbal medicines? no   Are you having any side effects from any of your medications? -  no  Have you stopped taking any of your medications?  Is so, why? -  no    Have you seen any other physician or provider since your last visit? No  Have you had any other diagnostic tests since your last visit? No  Have you been seen in the emergency room and/or had an admission to a hospital since we last saw you? No  Have you had your routine dental cleaning in the past 6 months? no    Have you activated your [employer] account? If not, what are your barriers?  Yes     Patient Care Team:  Paulette Kathleen MD as PCP - Alton Espinal MD as PCP - [facility] Provider  Reyna Quesada MD as Consulting Physician (Gastroenterology)  José Miguel Woods MD as Consulting Physician (Radiation Oncology)  George Machado MD as Consulting Physician (Urology)    Medical History Review  Past Medical, Family, and Social History reviewed and does not contribute to the patient presenting condition    Health Maintenance   Topic Date Due    Pneumococcal 0-64 years Vaccine (1 of 4 - PCV13) Never done    HIV screen  Never done    DTaP/Tdap/Td vaccine (1 - Tdap) Never done    Shingles Vaccine (1 of 2) Never done    Potassium monitoring  05/20/2021    Creatinine monitoring  05/20/2021    Diabetes screen  06/29/2021    PSA counseling  04/01/2022    Lipid screen  03/08/2023    Colon cancer screen colonoscopy  12/04/2024    Flu vaccine  Completed    COVID-19 Vaccine  Completed    Hepatitis C screen  Completed    Hepatitis A vaccine  Aged Out    Hepatitis B vaccine  Aged Out    Hib vaccine  Aged Out    Meningococcal (ACWY) vaccine  Aged Out

## 2021-11-29 DIAGNOSIS — J20.1 ACUTE BRONCHITIS DUE TO HAEMOPHILUS INFLUENZAE: ICD-10-CM

## 2021-11-29 RX ORDER — DICYCLOMINE HYDROCHLORIDE 10 MG/1
10 CAPSULE ORAL 2 TIMES DAILY PRN
Qty: 120 CAPSULE | Refills: 1 | Status: SHIPPED | OUTPATIENT
Start: 2021-11-29

## 2021-11-29 RX ORDER — DOXYCYCLINE HYCLATE 100 MG
100 TABLET ORAL 2 TIMES DAILY
Qty: 14 TABLET | Refills: 0 | Status: SHIPPED | OUTPATIENT
Start: 2021-11-29 | End: 2021-12-06

## 2021-11-29 NOTE — TELEPHONE ENCOUNTER
Pt called stating he was doing better but over the weekend doing yard work he started to feel worse. Pt requesting a refill.    Please send to [facility]

## 2021-12-07 DIAGNOSIS — I10 ESSENTIAL HYPERTENSION: Chronic | ICD-10-CM

## 2021-12-21 ENCOUNTER — TELEPHONE (OUTPATIENT)
Dept: RADIATION ONCOLOGY | Facility: MEDICAL CENTER | Age: 61
End: 2021-12-21

## 2022-01-19 ENCOUNTER — HOSPITAL ENCOUNTER (OUTPATIENT)
Age: 62
Setting detail: SPECIMEN
Discharge: HOME | End: 2022-01-19

## 2022-01-19 DIAGNOSIS — Z13.1 ENCOUNTER FOR SCREENING FOR DIABETES MELLITUS: ICD-10-CM

## 2022-01-20 LAB
GLUCOSE FASTING: 82 MG/DL (ref 70–99)
PROSTATE SPECIFIC ANTIGEN: <0.02 UG/L

## 2022-02-09 DIAGNOSIS — F41.1 ANXIETY STATE: ICD-10-CM

## 2022-02-09 RX ORDER — TRAZODONE HYDROCHLORIDE 50 MG/1
TABLET ORAL
Qty: 30 TABLET | Refills: 3 | Status: SHIPPED | OUTPATIENT
Start: 2022-02-09 | End: 2022-02-15 | Stop reason: SDUPTHER

## 2022-02-15 DIAGNOSIS — F41.1 ANXIETY STATE: ICD-10-CM

## 2022-02-15 RX ORDER — TRAZODONE HYDROCHLORIDE 50 MG/1
50 TABLET ORAL NIGHTLY
Qty: 90 TABLET | Refills: 3 | Status: SHIPPED | OUTPATIENT
Start: 2022-02-15

## 2022-03-07 DIAGNOSIS — J30.2 SEASONAL ALLERGIES: ICD-10-CM

## 2022-03-07 RX ORDER — FEXOFENADINE HCL 180 MG/1
TABLET ORAL
Qty: 30 TABLET | Refills: 3 | Status: SHIPPED | OUTPATIENT
Start: 2022-03-07 | End: 2022-07-20

## 2022-07-20 DIAGNOSIS — J30.2 SEASONAL ALLERGIES: ICD-10-CM

## 2022-07-20 RX ORDER — FEXOFENADINE HCL 180 MG/1
TABLET ORAL
Qty: 30 TABLET | Refills: 3 | Status: SHIPPED | OUTPATIENT
Start: 2022-07-20

## 2022-12-02 DIAGNOSIS — I10 ESSENTIAL HYPERTENSION: Chronic | ICD-10-CM

## 2022-12-02 DIAGNOSIS — J30.2 SEASONAL ALLERGIES: ICD-10-CM

## 2022-12-02 RX ORDER — FEXOFENADINE HCL 180 MG/1
TABLET ORAL
Qty: 30 TABLET | Refills: 3 | OUTPATIENT
Start: 2022-12-02

## 2022-12-08 ENCOUNTER — HOSPITAL ENCOUNTER (OUTPATIENT)
Dept: ULTRASOUND IMAGING | Age: 62
Discharge: HOME | End: 2022-12-10
Payer: COMMERCIAL

## 2022-12-08 DIAGNOSIS — N50.89 SWELLING, SCROTUM: ICD-10-CM

## 2022-12-08 DIAGNOSIS — J30.2 SEASONAL ALLERGIES: ICD-10-CM

## 2022-12-08 PROCEDURE — 93976 VASCULAR STUDY: CPT

## 2022-12-09 RX ORDER — FEXOFENADINE HCL 180 MG/1
TABLET ORAL
Qty: 30 TABLET | Refills: 3 | OUTPATIENT
Start: 2022-12-09

## 2023-01-23 DIAGNOSIS — F41.1 ANXIETY STATE: ICD-10-CM

## 2023-01-23 RX ORDER — TRAZODONE HYDROCHLORIDE 50 MG/1
TABLET ORAL
Qty: 90 TABLET | Refills: 3 | OUTPATIENT
Start: 2023-01-23

## 2023-02-24 ENCOUNTER — HOSPITAL ENCOUNTER (OUTPATIENT)
Dept: SLEEP CENTER | Age: 63
End: 2023-02-24
Payer: COMMERCIAL

## 2023-02-24 PROCEDURE — 95810 POLYSOM 6/> YRS 4/> PARAM: CPT

## 2023-03-02 LAB — STATUS: NORMAL

## 2023-08-24 ENCOUNTER — OFFICE VISIT (OUTPATIENT)
Dept: INFECTIOUS DISEASES | Age: 63
End: 2023-08-24
Payer: COMMERCIAL

## 2023-08-24 VITALS
TEMPERATURE: 98.1 F | OXYGEN SATURATION: 97 % | WEIGHT: 190 LBS | DIASTOLIC BLOOD PRESSURE: 81 MMHG | HEIGHT: 63 IN | SYSTOLIC BLOOD PRESSURE: 123 MMHG | HEART RATE: 58 BPM | BODY MASS INDEX: 33.66 KG/M2 | RESPIRATION RATE: 16 BRPM

## 2023-08-24 DIAGNOSIS — C61 PROSTATE CANCER (HCC): ICD-10-CM

## 2023-08-24 DIAGNOSIS — B04 MONKEYPOX: Primary | ICD-10-CM

## 2023-08-24 PROCEDURE — 3017F COLORECTAL CA SCREEN DOC REV: CPT | Performed by: INTERNAL MEDICINE

## 2023-08-24 PROCEDURE — 3078F DIAST BP <80 MM HG: CPT | Performed by: INTERNAL MEDICINE

## 2023-08-24 PROCEDURE — G8417 CALC BMI ABV UP PARAM F/U: HCPCS | Performed by: INTERNAL MEDICINE

## 2023-08-24 PROCEDURE — 1036F TOBACCO NON-USER: CPT | Performed by: INTERNAL MEDICINE

## 2023-08-24 PROCEDURE — 3074F SYST BP LT 130 MM HG: CPT | Performed by: INTERNAL MEDICINE

## 2023-08-24 PROCEDURE — G8427 DOCREV CUR MEDS BY ELIG CLIN: HCPCS | Performed by: INTERNAL MEDICINE

## 2023-08-24 PROCEDURE — 99204 OFFICE O/P NEW MOD 45 MIN: CPT | Performed by: INTERNAL MEDICINE

## 2023-08-24 ASSESSMENT — ENCOUNTER SYMPTOMS
GASTROINTESTINAL NEGATIVE: 1
RESPIRATORY NEGATIVE: 1

## 2023-08-24 NOTE — PROGRESS NOTES
[facility]   Office Consult Note  Today's Date and Time: 8/28/2023, 5:07 PM    Impression:     1. Monkeypox    2. Prostate cancer [facility])         Recommendations   I had a lengthy discussion with Astrid Familia that the skin lesions that have been biopsy positive for monkeypox typically should resolve given that he is not an immunocompromised host.  He does not report that any of the contacts at home have developed any of the skin lesion. At this point in time he does not really qualify for treatment but I will try to reach out to the Department of Health given that the patient has had the skin lesions present for at least 3 months now with no resolution. I have ordered the following medications/ labs:  No orders of the defined types were placed in this encounter. No orders of the defined types were placed in this encounter. Chief complaint/reason for consultation:     Chief Complaint   Patient presents with    Frequent Infections     Monkey Pox. Patient originally diagnosed Dec 2022. Resolved. Patient reports infection came back while vacationing in [place] 6/13/23         History of Present Illness:   Tatyana Monge is a 66y.o.-year-old male who is seen at there request of MD Astrid Palomares has a history of central sleep apnea, anxiety, bronchitis, prostate cancer and essential hypertension. The patient reports that he was diagnosed with shingles in December given Valtrex cream and his lesions resolved in 2 to 6 weeks and he never really had any issues. He reports then he went to [place] with his family and he states that he has irritable bowel syndrome and he had a \"IBS attack\" and ended up not making it to the bathroom had some soiling of stool and went to a bathroom off the beach and the same day he reports he noticed a rash and later in that week he was not feeling well but the malaise was very short-lived.       The patient reports that the rash noted was on the

## 2023-09-08 ENCOUNTER — TELEPHONE (OUTPATIENT)
Dept: INFECTIOUS DISEASES | Age: 63
End: 2023-09-08

## 2023-09-08 NOTE — TELEPHONE ENCOUNTER
Patent called and states the lesions have not yet resolved and he is having much discomfort sleeping. He wanted to know how to proceed he was hoping you talked to the Department of Health. Please advise.

## 2023-09-13 NOTE — TELEPHONE ENCOUNTER
Patient is calling again he was in such misery he saw dermatology and they put on an abx he does not remember the name. He wanted to know if you called the [facility] since he last asked.

## 2023-09-15 NOTE — TELEPHONE ENCOUNTER
Per Dr Sonia Bartholomew - he talked to Dept. Of Health - schedule pt  next week / end of day. Dr Sonia Bartholomew expecting a fax from Health Dept with list of needed testing.

## 2023-09-20 ENCOUNTER — HOSPITAL ENCOUNTER (OUTPATIENT)
Age: 63
Setting detail: SPECIMEN
Discharge: HOME | End: 2023-09-20

## 2023-09-20 ENCOUNTER — OFFICE VISIT (OUTPATIENT)
Dept: INFECTIOUS DISEASES | Age: 63
End: 2023-09-20
Payer: COMMERCIAL

## 2023-09-20 VITALS
HEIGHT: 63 IN | WEIGHT: 190 LBS | DIASTOLIC BLOOD PRESSURE: 82 MMHG | BODY MASS INDEX: 33.66 KG/M2 | SYSTOLIC BLOOD PRESSURE: 135 MMHG

## 2023-09-20 DIAGNOSIS — N50.89 SKIN ULCER OF SCROTUM: ICD-10-CM

## 2023-09-20 DIAGNOSIS — N50.89 SKIN ULCER OF SCROTUM: Primary | ICD-10-CM

## 2023-09-20 PROCEDURE — 3078F DIAST BP <80 MM HG: CPT | Performed by: INTERNAL MEDICINE

## 2023-09-20 PROCEDURE — 3017F COLORECTAL CA SCREEN DOC REV: CPT | Performed by: INTERNAL MEDICINE

## 2023-09-20 PROCEDURE — 99214 OFFICE O/P EST MOD 30 MIN: CPT | Performed by: INTERNAL MEDICINE

## 2023-09-20 PROCEDURE — G8417 CALC BMI ABV UP PARAM F/U: HCPCS | Performed by: INTERNAL MEDICINE

## 2023-09-20 PROCEDURE — G8427 DOCREV CUR MEDS BY ELIG CLIN: HCPCS | Performed by: INTERNAL MEDICINE

## 2023-09-20 PROCEDURE — 1036F TOBACCO NON-USER: CPT | Performed by: INTERNAL MEDICINE

## 2023-09-20 PROCEDURE — 3074F SYST BP LT 130 MM HG: CPT | Performed by: INTERNAL MEDICINE

## 2023-09-20 RX ORDER — MINOCYCLINE HYDROCHLORIDE 100 MG/1
100 CAPSULE ORAL 2 TIMES DAILY
COMMUNITY
Start: 2023-09-08

## 2023-09-20 NOTE — PROGRESS NOTES
biopsy suggested monkeypox. The patient had no risk factors for severe disease and was not felt to be a candidate for therapy and this was discussed with the Department of Health/CDC staff just to get some ideas and help on his management. It was decided at that point in time there was worth repeating the monkeypox testing and also doing routine STD testing as well. Patient is otherwise doing well reports that he did get an episode where he had increased pain that was burning type sensation in the scrotal area and he was treated with a course of antibiotics he reports with improvement in the pain. I have personally reviewedthe past medical history, medications, social history, and I have updated the database accordingly. Past Medical History:     Past Medical History:   Diagnosis Date    Anxiety state, unspecified     Central sleep apnea     uses SV machine, [facility]    Cholelithiasis     History of bronchitis 2017    History of rectal bleeding 2014    Hypoglycemia     Other and unspecified angina pectoris     2009-stress related    Prostate cancer ([address]) 2020    Unspecified essential hypertension     Vision abnormalities      Medications:     Current Outpatient Medications   Medication Sig Dispense Refill    minocycline (MINOCIN;DYNACIN) 100 MG capsule Take 1 capsule by mouth 2 times daily      traZODone (DESYREL) 50 MG tablet Take 1 tablet by mouth nightly 90 tablet 3    metoprolol tartrate (LOPRESSOR) 25 MG tablet TAKE ONE-HALF (1/2) TABLET TWICE A DAY 90 tablet 3    dicyclomine (BENTYL) 10 MG capsule Take 1 capsule by mouth 2 times daily as needed (Abdominal cramps) 120 capsule 1    hydrOXYzine (ATARAX) 25 MG tablet       predniSONE (DELTASONE) 20 MG tablet Take 40mg for 3 days, then 20mg for 4 days.  10 tablet 0    oxybutynin (DITROPAN) 5 MG tablet       cholestyramine (QUESTRAN) 4 g packet Take one dose 30 minutes before each meal. 60 packet 5    clonazePAM (KLONOPIN) 1 MG tablet Take 1

## 2023-09-21 ENCOUNTER — TELEPHONE (OUTPATIENT)
Dept: INFECTIOUS DISEASES | Age: 63
End: 2023-09-21

## 2023-09-21 LAB
HSV1 DNA SPEC QL NAA+PROBE: NEGATIVE
HSV2 DNA SPEC QL NAA+PROBE: NEGATIVE
REASON FOR REJECTION: NORMAL
SEND OUT REPORT: NORMAL
SPECIMEN DESCRIPTION: NORMAL
SPECIMEN SOURCE: NORMAL
TEST NAME: NORMAL
ZZ NTE CLEAN UP: ORDERED TEST: NORMAL

## 2023-09-25 ENCOUNTER — HOSPITAL ENCOUNTER (OUTPATIENT)
Age: 63
Discharge: HOME | End: 2023-09-25
Payer: COMMERCIAL

## 2023-09-25 DIAGNOSIS — N50.89 SKIN ULCER OF SCROTUM: ICD-10-CM

## 2023-09-25 LAB
HIV 1+2 AB+HIV1 P24 AG SERPL QL IA: NONREACTIVE
IGA SERPL-MCNC: 202 MG/DL (ref 70–400)
IGG SERPL-MCNC: 1056 MG/DL (ref 700–1600)
IGM SERPL-MCNC: 152 MG/DL (ref 40–230)

## 2023-09-25 PROCEDURE — 86355 B CELLS TOTAL COUNT: CPT

## 2023-09-25 PROCEDURE — 87491 CHLMYD TRACH DNA AMP PROBE: CPT

## 2023-09-25 PROCEDURE — 87591 N.GONORRHOEAE DNA AMP PROB: CPT

## 2023-09-25 PROCEDURE — 82784 ASSAY IGA/IGD/IGG/IGM EACH: CPT

## 2023-09-25 PROCEDURE — 87389 HIV-1 AG W/HIV-1&-2 AB AG IA: CPT

## 2023-09-25 PROCEDURE — 86357 NK CELLS TOTAL COUNT: CPT

## 2023-09-25 PROCEDURE — 36415 COLL VENOUS BLD VENIPUNCTURE: CPT

## 2023-09-25 PROCEDURE — 86360 T CELL ABSOLUTE COUNT/RATIO: CPT

## 2023-09-25 PROCEDURE — 86359 T CELLS TOTAL COUNT: CPT

## 2023-09-25 ASSESSMENT — ENCOUNTER SYMPTOMS
RESPIRATORY NEGATIVE: 1
GASTROINTESTINAL NEGATIVE: 1

## 2023-09-26 LAB
% NK (CD56): 8 % (ref 6–29)
AB NK (CD56): 112 /UL (ref 90–600)
ABSOLUTE CD 3: 1106 /UL (ref 411–2061)
ABSOLUTE CD 8 (SUPP): 336 /UL (ref 282–999)
ABSOLUTE CD19 B CELL: 168 /UL (ref 71–567)
CD19 B CELL: 12 % (ref 5–25)
CD3 % TOTAL T CELLS: 79 % (ref 57–94)
CD3+CD4+ CELLS # BLD: 742 /UL (ref 309–1571)
CD3+CD4+ CELLS NFR BLD: 53 % (ref 27–64)
CD4:CD8: 2.21 (ref 0.6–2.8)
CD8 % SUPPRESSOR T CELL: 24 % (ref 17–48)
CHLAMYDIA DNA UR QL NAA+PROBE: NEGATIVE
LYMPHOCYTES # BLD: 28 % (ref 24–44)
N GONORRHOEA DNA UR QL NAA+PROBE: NEGATIVE
SPECIMEN DESCRIPTION: NORMAL
WBC # BLD: 5 K/UL (ref 3.5–11)

## 2023-09-28 ENCOUNTER — TELEPHONE (OUTPATIENT)
Dept: INFECTIOUS DISEASES | Age: 63
End: 2023-09-28

## 2023-09-28 NOTE — TELEPHONE ENCOUNTER
Pt recently completed labs- per last office note - Dr Donny Rhodes will call pt with results   Pt is calling office for results. Please review and advise OR call pt.

## 2023-10-03 ENCOUNTER — TELEPHONE (OUTPATIENT)
Dept: INFECTIOUS DISEASES | Age: 63
End: 2023-10-03

## 2023-10-03 ENCOUNTER — OFFICE VISIT (OUTPATIENT)
Dept: INFECTIOUS DISEASES | Age: 63
End: 2023-10-03
Payer: COMMERCIAL

## 2023-10-03 VITALS
TEMPERATURE: 97 F | RESPIRATION RATE: 12 BRPM | DIASTOLIC BLOOD PRESSURE: 85 MMHG | HEIGHT: 63 IN | SYSTOLIC BLOOD PRESSURE: 127 MMHG | HEART RATE: 69 BPM | BODY MASS INDEX: 33.31 KG/M2 | WEIGHT: 188 LBS

## 2023-10-03 DIAGNOSIS — N50.89 ULCER, SCROTUM: Primary | ICD-10-CM

## 2023-10-03 PROCEDURE — G8484 FLU IMMUNIZE NO ADMIN: HCPCS | Performed by: INTERNAL MEDICINE

## 2023-10-03 PROCEDURE — 99214 OFFICE O/P EST MOD 30 MIN: CPT | Performed by: INTERNAL MEDICINE

## 2023-10-03 PROCEDURE — 1036F TOBACCO NON-USER: CPT | Performed by: INTERNAL MEDICINE

## 2023-10-03 PROCEDURE — 3078F DIAST BP <80 MM HG: CPT | Performed by: INTERNAL MEDICINE

## 2023-10-03 PROCEDURE — 3017F COLORECTAL CA SCREEN DOC REV: CPT | Performed by: INTERNAL MEDICINE

## 2023-10-03 PROCEDURE — G8417 CALC BMI ABV UP PARAM F/U: HCPCS | Performed by: INTERNAL MEDICINE

## 2023-10-03 PROCEDURE — G8427 DOCREV CUR MEDS BY ELIG CLIN: HCPCS | Performed by: INTERNAL MEDICINE

## 2023-10-03 PROCEDURE — 3074F SYST BP LT 130 MM HG: CPT | Performed by: INTERNAL MEDICINE

## 2023-10-03 RX ORDER — VALACYCLOVIR HYDROCHLORIDE 1 G/1
1000 TABLET, FILM COATED ORAL 3 TIMES DAILY
Qty: 90 TABLET | Refills: 0 | Status: SHIPPED | OUTPATIENT
Start: 2023-10-03 | End: 2023-11-02

## 2023-10-03 RX ORDER — VALACYCLOVIR HYDROCHLORIDE 1 G/1
1000 TABLET, FILM COATED ORAL 3 TIMES DAILY
Qty: 90 TABLET | Refills: 0 | Status: SHIPPED | OUTPATIENT
Start: 2023-10-03 | End: 2023-10-03

## 2023-10-03 ASSESSMENT — ENCOUNTER SYMPTOMS
RESPIRATORY NEGATIVE: 1
GASTROINTESTINAL NEGATIVE: 1

## 2023-10-03 NOTE — PROGRESS NOTES
[facility]  Office Progress Note  Today's Date and Time: 10/3/2023, 3:31 PM    Impression:     1. Ulcer, scrotum         Recommendations   The patient developed pain and subsequently multiple ulcerations in the scrotal area and the history and clinical findings seem more consistent with HSV infection than a pox virus eruption  Monkeypox would not resolve and then subsequently flareup and the patient does not have any evidence of a sexually transmitted disease on previous testing but at that point in time all of his lesions were essentially healed. The patient was asymptomatic at last testing but clearly there is no HIV infection, and the serum immunoglobulins as well as lymphocyte subsets are all within normal limits  I am going to treat him as for a herpes simplex virus infection valacyclovir  The patient asked me if he should keep his dermatology appointment and I have asked him to keep this as he continues to have eruptions and it may be worth doing another biopsy of the ulcerations  The patient will follow-up with me in the next month    I have ordered the following medications/ labs:  No orders of the defined types were placed in this encounter. Orders Placed This Encounter   Medications    DISCONTD: valACYclovir (VALTREX) 1 g tablet     Sig: Take 1 tablet by mouth 3 times daily     Dispense:  90 tablet     Refill:  0    valACYclovir (VALTREX) 1 g tablet     Sig: Take 1 tablet by mouth 3 times daily     Dispense:  90 tablet     Refill:  0       Chief complaint/reason for consultation:     Chief Complaint   Patient presents with    Wound Check     Follow up        History of Present Illness:   Andrew Dumont is a 66y.o.-year-old male who I am seeing in follow-up for a scrotal ulceration which initially was felt to be monkeypox but at his last visit the ulceration was healing.   I had done testing looking for HIV and other sexually transmitted infections at his last visit and when I

## 2023-10-03 NOTE — TELEPHONE ENCOUNTER
I called the patient and went over his test results and discussed that all of his testing was essentially negative including HIV screen, HSV 1 and 2 PCR testing, chlamydia and gonorrhea testing. Unfortunately the monkeypox testing was rejected at the [facility] lab as the specimen was not \"frozen\". The patient reports that he had a flareup over the weekend and reports that he has new \"blisters\" and have asked him to come in today to be evaluated.     Electronically signed by Fatmata Germain MD on 10/3/2023 at 11:54 AM no

## 2023-10-13 ENCOUNTER — TELEPHONE (OUTPATIENT)
Dept: INFECTIOUS DISEASES | Age: 63
End: 2023-10-13

## 2023-10-13 NOTE — TELEPHONE ENCOUNTER
Patient  called and has been on Valtrex for 1 week and Wednesday after supper he felt dizzy and had an upset stomach and then before bed vomited. He stopped the valtrex he is asking what to do next he wanted you to know he is also taking minocycline from another doctor.

## 2023-10-16 NOTE — TELEPHONE ENCOUNTER
When can please see telephone encounter from 10-13 patient would like an answer. Negin Thomas \"Khadar\"  Male, 66 y.o., 1960  Thanks, Cynthia  Read 10/16/2023 10:04 AM  10/16/2023 10:10 AM  He states that he took the minocycline before the valtrex so he wants to know since it made him sick should he go back on the minocycline IF so you would need to do an RX he is currently taking nothing. Read 10/16/2023 10:29 AM  10/16/2023 10:30 AM  Those two are not the same at all, the Valtrex is for viral infection, minocycline is for bacterial infection. So I would not substitute one for the other. 10/16/2023 10:42 AM  He will try taking valtrex again and if he gets sick he will call us.   Federico Alpers

## 2023-11-20 ENCOUNTER — HOSPITAL ENCOUNTER (OUTPATIENT)
Age: 63
Discharge: HOME | End: 2023-11-20
Payer: COMMERCIAL

## 2023-11-20 LAB — T PALLIDUM AB SER QL IA: NONREACTIVE

## 2023-11-20 PROCEDURE — 86780 TREPONEMA PALLIDUM: CPT

## 2023-11-20 PROCEDURE — 36415 COLL VENOUS BLD VENIPUNCTURE: CPT

## 2023-12-01 ENCOUNTER — HOSPITAL ENCOUNTER (OUTPATIENT)
Age: 63
Discharge: HOME | End: 2023-12-01
Payer: COMMERCIAL

## 2023-12-01 LAB — T PALLIDUM AB SER QL IA: NONREACTIVE

## 2023-12-01 PROCEDURE — 86780 TREPONEMA PALLIDUM: CPT

## 2023-12-01 PROCEDURE — 36415 COLL VENOUS BLD VENIPUNCTURE: CPT

## 2023-12-05 LAB — T PALLIDUM AB SER QL HA: NONREACTIVE

## 2024-02-19 ENCOUNTER — HOSPITAL ENCOUNTER (OUTPATIENT)
Age: 64
Discharge: HOME | End: 2024-02-19
Payer: COMMERCIAL

## 2024-02-19 LAB — PSA SERPL-MCNC: <0.03 NG/ML (ref 0–4)

## 2024-02-19 PROCEDURE — 84153 ASSAY OF PSA TOTAL: CPT

## 2024-02-19 PROCEDURE — 36415 COLL VENOUS BLD VENIPUNCTURE: CPT

## 2024-08-16 ENCOUNTER — HOSPITAL ENCOUNTER (OUTPATIENT)
Age: 64
Discharge: HOME | End: 2024-08-16
Payer: COMMERCIAL

## 2024-08-16 LAB — PSA SERPL-MCNC: <0.03 NG/ML (ref 0–4)

## 2024-08-16 PROCEDURE — 36415 COLL VENOUS BLD VENIPUNCTURE: CPT

## 2024-08-16 PROCEDURE — 84153 ASSAY OF PSA TOTAL: CPT

## 2025-02-25 ENCOUNTER — HOSPITAL ENCOUNTER (OUTPATIENT)
Age: 65
Discharge: HOME | End: 2025-02-25
Payer: COMMERCIAL

## 2025-02-25 LAB — PSA SERPL-MCNC: <0.03 NG/ML (ref 0–4)

## 2025-02-25 PROCEDURE — 84153 ASSAY OF PSA TOTAL: CPT

## 2025-02-25 PROCEDURE — 36415 COLL VENOUS BLD VENIPUNCTURE: CPT

## (undated) DEVICE — RESERVOIR,SUCTION,100CC,SILICONE: Brand: MEDLINE

## (undated) DEVICE — CANNULA SEAL

## (undated) DEVICE — SURGICEL ENDOSCP APPL

## (undated) DEVICE — ELECTRO LUBE IS A SINGLE PATIENT USE DEVICE THAT IS INTENDED TO BE USED ON ELECTROSURGICAL ELECTRODES TO REDUCE STICKING.: Brand: KEY SURGICAL ELECTRO LUBE

## (undated) DEVICE — BLADE CLIPPER GEN PURP NS

## (undated) DEVICE — SUTURE VCRL + SZ 0 L18IN ABSRB VLT L36MM CT-1 1/2 CIR VCP740D

## (undated) DEVICE — SUTURE MCRYL SZ 3-0 L27IN ABSRB VLT L17MM RB-1 1/2 CIR Y305H

## (undated) DEVICE — CONTAINER,SPECIMEN,4OZ,OR STRL: Brand: MEDLINE

## (undated) DEVICE — SOLUTION ANTIFOG VIS SYS CLEARIFY LAPSCP

## (undated) DEVICE — ARM DRAPE

## (undated) DEVICE — AIRSEAL 12 MM ACCESS PORT AND PALM GRIP OBTURATOR WITH BLADELESS OPTICAL TIP, 120 MM LENGTH: Brand: AIRSEAL

## (undated) DEVICE — SHEET DRAPE FULL 70X100

## (undated) DEVICE — SCISSOR SURG METZ CRV TIP

## (undated) DEVICE — GLOVE ORTHO 7 1/2   MSG9475

## (undated) DEVICE — GOWN,AURORA,NONREINFORCED,LARGE: Brand: MEDLINE

## (undated) DEVICE — SUTURE MCRYL + SZ 4 0 L18IN ABSRB UD PC 3 L16MM 3 8 CIR PRIM MCP845G

## (undated) DEVICE — 3M™ IOBAN™ 2 ANTIMICROBIAL INCISE DRAPE 6650EZ: Brand: IOBAN™ 2

## (undated) DEVICE — CATHETER URETH 18FR BLLN 30CC 2 W F INF CTRL BARDX

## (undated) DEVICE — METER,URINE,400ML,DRAIN BAG,L/F,LL: Brand: MEDLINE

## (undated) DEVICE — BLADELESS OBTURATOR: Brand: WECK VISTA

## (undated) DEVICE — CLIP INT XL YEL POLYMER HEM-O-LOK WECK

## (undated) DEVICE — PROTECTOR ULN NRV PUR FOAM HK LOOP STRP ANATOMICALLY

## (undated) DEVICE — Device

## (undated) DEVICE — TOWEL,OR,DSP,ST,NATURAL,DLX,4/PK,20PK/CS: Brand: MEDLINE

## (undated) DEVICE — SUTURE V-LOC 180 SZ 0 L9IN ABSRB GRN GS-21 L37MM 1/2 CIR VLOCL0346

## (undated) DEVICE — COVER,LIGHT HANDLE,FLX,2/PK: Brand: MEDLINE INDUSTRIES, INC.

## (undated) DEVICE — SUTURE VCRL SZ 1 L18IN ABSRB VLT CT-1 L36MM 1/2 CIR J741D

## (undated) DEVICE — GARMENT COMPR STD FOR 17IN CALF UNIF THER FLOTRN

## (undated) DEVICE — GLOVE ORANGE PI 7   MSG9070

## (undated) DEVICE — POSITIONER,HEAD,MULTIRING,36CS: Brand: MEDLINE

## (undated) DEVICE — Z DISCONTINUED USE 2717540 SUTURE ABSORBABLE MONOFILAMENT 3-0 RB 1 6 IN STRATAFIX SPRL

## (undated) DEVICE — AGENT HEMSTAT 3GM OXIDIZED REGENERATED CELOS ABSRB FOR CONT (ORDER MULTIPLES OF 5EA)

## (undated) DEVICE — APPLICATOR MEDICATED 26 CC SOLUTION HI LT ORNG CHLORAPREP

## (undated) DEVICE — ADHESIVE SKIN CLSR 0.7ML TOP DERMBND ADV

## (undated) DEVICE — TRI-LUMEN FILTERED TUBE SET WITH ACTIVATED CHARCOAL FILTER: Brand: AIRSEAL

## (undated) DEVICE — GARMENT,MEDLINE,DVT,INT,CALF,MED, GEN2: Brand: MEDLINE

## (undated) DEVICE — DRAIN,WOUND,15FR,3/16,FULL-FLUTED: Brand: MEDLINE

## (undated) DEVICE — 40580 - THE PINK PAD - ADVANCED TRENDELENBURG POSITIONING KIT: Brand: 40580 - THE PINK PAD - ADVANCED TRENDELENBURG POSITIONING KIT

## (undated) DEVICE — TIP COVER ACCESSORY